# Patient Record
Sex: FEMALE | Race: WHITE | Employment: FULL TIME | URBAN - METROPOLITAN AREA
[De-identification: names, ages, dates, MRNs, and addresses within clinical notes are randomized per-mention and may not be internally consistent; named-entity substitution may affect disease eponyms.]

---

## 2019-11-22 ENCOUNTER — ANESTHESIA EVENT (OUTPATIENT)
Dept: SURGERY | Age: 64
End: 2019-11-22
Payer: COMMERCIAL

## 2019-11-22 RX ORDER — ALENDRONATE SODIUM 70 MG/1
TABLET ORAL
COMMUNITY
End: 2021-06-24

## 2019-11-22 RX ORDER — MELOXICAM 15 MG/1
15 TABLET ORAL DAILY
COMMUNITY
End: 2021-07-08

## 2019-11-22 NOTE — PERIOP NOTES
PAT - SURGICAL PRE-ADMISSION INSTRUCTIONS    NAME:  Demarcus Don                                                          TODAY'S DATE:  11/22/2019    SURGERY DATE:  11/25/2019                                  SURGERY ARRIVAL TIME:   TBV    1. Do NOT eat or drink anything, including candy or gum, after MIDNIGHT on 11/24/2019 , unless you have specific instructions from your Surgeon or Anesthesia Provider to do so. 2. No smoking on the day of surgery. 3. No alcohol 24 hours prior to the day of surgery. 4. No recreational drugs for one week prior to the day of surgery. 5. Leave all valuables, including money/purse, at home. 6. Remove all jewelry, nail polish, makeup (including mascara); no lotions, powders, deodorant, or perfume/cologne/after shave. 7. Glasses/Contact lenses and Dentures may be worn to the hospital.  They will be removed prior to surgery. 8. Call your doctor if symptoms of a cold or illness develop within 24 ours prior to surgery. 9. AN ADULT MUST DRIVE YOU HOME AFTER OUTPATIENT SURGERY. 10. If you are having an OUTPATIENT procedure, please make arrangements for a responsible adult to be with you for 24 hours after your surgery. 11. If you are admitted to the hospital, you will be assigned to a bed after surgery is complete. Normally a family member will not be able to see you until you are in your assigned bed. 15. Family is encouraged to accompany you to the hospital.  We ask visitors in the treatment area to be limited to ONE person at a time to ensure patient privacy. EXCEPTIONS WILL BE MADE AS NEEDED. 15. Children under 12 are discouraged from entering the treatment area and need to be supervised by an adult when in the waiting room.     Special Instructions:    STOP anticoagulants AT LEAST 1 WEEK PRIOR to your surgery or, follow other MD instructions:  No NSAIDS    Patient Prep:    shower with anti-bacterial soap    These surgical instructions were reviewed with Demarcus Don during the PAT visit. Directions: On the morning of surgery, please go to the [facility]. Enter the building from the [facility] entrance, 1st floor (next to the Emergency Room entrance). Take the elevator to the 2nd floor. Sign in at the Registration Desk.     If you have any questions and/or concerns, please do not hesitate to call:  (Prior to the day of surgery)  [facility] unit:  [phone]  (Day of surgery)  [facility] unit:  [phone]

## 2019-11-25 ENCOUNTER — ANESTHESIA (OUTPATIENT)
Dept: SURGERY | Age: 64
End: 2019-11-25
Payer: COMMERCIAL

## 2019-11-25 ENCOUNTER — HOSPITAL ENCOUNTER (OUTPATIENT)
Age: 64
Setting detail: OUTPATIENT SURGERY
Discharge: HOME | End: 2019-11-25
Attending: ORTHOPAEDIC SURGERY | Admitting: ORTHOPAEDIC SURGERY
Payer: COMMERCIAL

## 2019-11-25 VITALS
HEART RATE: 88 BPM | RESPIRATION RATE: 18 BRPM | WEIGHT: 149.38 LBS | HEIGHT: 60 IN | SYSTOLIC BLOOD PRESSURE: 150 MMHG | BODY MASS INDEX: 29.33 KG/M2 | TEMPERATURE: 97.7 F | DIASTOLIC BLOOD PRESSURE: 73 MMHG | OXYGEN SATURATION: 99 %

## 2019-11-25 DIAGNOSIS — M23.305 DERANGEMENT OF MEDIAL MENISCUS, UNSPECIFIED LATERALITY: Primary | ICD-10-CM

## 2019-11-25 PROCEDURE — 77030020754 HC CUF TRNQT 2BLA STRY -B: Performed by: ORTHOPAEDIC SURGERY

## 2019-11-25 PROCEDURE — 74011250636 HC RX REV CODE- 250/636: Performed by: ORTHOPAEDIC SURGERY

## 2019-11-25 PROCEDURE — 77030018835 HC SOL IRR LR ICUM -A: Performed by: ORTHOPAEDIC SURGERY

## 2019-11-25 PROCEDURE — 76010000138 HC OR TIME 0.5 TO 1 HR: Performed by: ORTHOPAEDIC SURGERY

## 2019-11-25 PROCEDURE — 76060000032 HC ANESTHESIA 0.5 TO 1 HR: Performed by: ORTHOPAEDIC SURGERY

## 2019-11-25 PROCEDURE — 74011250636 HC RX REV CODE- 250/636: Performed by: NURSE ANESTHETIST, CERTIFIED REGISTERED

## 2019-11-25 PROCEDURE — 74011000250 HC RX REV CODE- 250: Performed by: NURSE ANESTHETIST, CERTIFIED REGISTERED

## 2019-11-25 PROCEDURE — 77030002916 HC SUT ETHLN J&J -A: Performed by: ORTHOPAEDIC SURGERY

## 2019-11-25 PROCEDURE — 76210000006 HC OR PH I REC 0.5 TO 1 HR: Performed by: ORTHOPAEDIC SURGERY

## 2019-11-25 PROCEDURE — 77030032490 HC SLV COMPR SCD KNE COVD -B: Performed by: ORTHOPAEDIC SURGERY

## 2019-11-25 PROCEDURE — 74011250637 HC RX REV CODE- 250/637: Performed by: NURSE ANESTHETIST, CERTIFIED REGISTERED

## 2019-11-25 PROCEDURE — 77030004451 HC BUR SHV S&N -B: Performed by: ORTHOPAEDIC SURGERY

## 2019-11-25 PROCEDURE — 76210000026 HC REC RM PH II 1 TO 1.5 HR: Performed by: ORTHOPAEDIC SURGERY

## 2019-11-25 PROCEDURE — 74011000272 HC RX REV CODE- 272: Performed by: ORTHOPAEDIC SURGERY

## 2019-11-25 PROCEDURE — 74011000250 HC RX REV CODE- 250: Performed by: ORTHOPAEDIC SURGERY

## 2019-11-25 RX ORDER — FAMOTIDINE 20 MG/1
20 TABLET, FILM COATED ORAL ONCE
Status: COMPLETED | OUTPATIENT
Start: 2019-11-25 | End: 2019-11-25

## 2019-11-25 RX ORDER — GLYCOPYRROLATE 0.2 MG/ML
INJECTION INTRAMUSCULAR; INTRAVENOUS AS NEEDED
Status: DISCONTINUED | OUTPATIENT
Start: 2019-11-25 | End: 2019-11-25 | Stop reason: HOSPADM

## 2019-11-25 RX ORDER — DEXAMETHASONE SODIUM PHOSPHATE 4 MG/ML
INJECTION, SOLUTION INTRA-ARTICULAR; INTRALESIONAL; INTRAMUSCULAR; INTRAVENOUS; SOFT TISSUE AS NEEDED
Status: DISCONTINUED | OUTPATIENT
Start: 2019-11-25 | End: 2019-11-25 | Stop reason: HOSPADM

## 2019-11-25 RX ORDER — DIPHENHYDRAMINE HYDROCHLORIDE 50 MG/ML
12.5 INJECTION, SOLUTION INTRAMUSCULAR; INTRAVENOUS
Status: DISCONTINUED | OUTPATIENT
Start: 2019-11-25 | End: 2019-11-25 | Stop reason: HOSPADM

## 2019-11-25 RX ORDER — SODIUM CHLORIDE, SODIUM LACTATE, POTASSIUM CHLORIDE, CALCIUM CHLORIDE 600; 310; 30; 20 MG/100ML; MG/100ML; MG/100ML; MG/100ML
25 INJECTION, SOLUTION INTRAVENOUS CONTINUOUS
Status: DISCONTINUED | OUTPATIENT
Start: 2019-11-25 | End: 2019-11-25 | Stop reason: HOSPADM

## 2019-11-25 RX ORDER — ALBUTEROL SULFATE 0.83 MG/ML
2.5 SOLUTION RESPIRATORY (INHALATION) AS NEEDED
Status: DISCONTINUED | OUTPATIENT
Start: 2019-11-25 | End: 2019-11-25 | Stop reason: HOSPADM

## 2019-11-25 RX ORDER — PROPOFOL 10 MG/ML
INJECTION, EMULSION INTRAVENOUS AS NEEDED
Status: DISCONTINUED | OUTPATIENT
Start: 2019-11-25 | End: 2019-11-25 | Stop reason: HOSPADM

## 2019-11-25 RX ORDER — ONDANSETRON 2 MG/ML
INJECTION INTRAMUSCULAR; INTRAVENOUS AS NEEDED
Status: DISCONTINUED | OUTPATIENT
Start: 2019-11-25 | End: 2019-11-25 | Stop reason: HOSPADM

## 2019-11-25 RX ORDER — FENTANYL CITRATE 50 UG/ML
INJECTION, SOLUTION INTRAMUSCULAR; INTRAVENOUS AS NEEDED
Status: DISCONTINUED | OUTPATIENT
Start: 2019-11-25 | End: 2019-11-25 | Stop reason: HOSPADM

## 2019-11-25 RX ORDER — HYDROMORPHONE HYDROCHLORIDE 1 MG/ML
0.5 INJECTION, SOLUTION INTRAMUSCULAR; INTRAVENOUS; SUBCUTANEOUS
Status: DISCONTINUED | OUTPATIENT
Start: 2019-11-25 | End: 2019-11-25 | Stop reason: HOSPADM

## 2019-11-25 RX ORDER — HYDROMORPHONE HYDROCHLORIDE 1 MG/ML
INJECTION, SOLUTION INTRAMUSCULAR; INTRAVENOUS; SUBCUTANEOUS
Status: DISCONTINUED
Start: 2019-11-25 | End: 2019-11-25 | Stop reason: HOSPADM

## 2019-11-25 RX ORDER — SODIUM CHLORIDE, SODIUM LACTATE, POTASSIUM CHLORIDE, CALCIUM CHLORIDE 600; 310; 30; 20 MG/100ML; MG/100ML; MG/100ML; MG/100ML
100 INJECTION, SOLUTION INTRAVENOUS CONTINUOUS
Status: DISCONTINUED | OUTPATIENT
Start: 2019-11-25 | End: 2019-11-25 | Stop reason: HOSPADM

## 2019-11-25 RX ORDER — LIDOCAINE HYDROCHLORIDE 10 MG/ML
0.1 INJECTION, SOLUTION EPIDURAL; INFILTRATION; INTRACAUDAL; PERINEURAL AS NEEDED
Status: DISCONTINUED | OUTPATIENT
Start: 2019-11-25 | End: 2019-11-25 | Stop reason: HOSPADM

## 2019-11-25 RX ORDER — FENTANYL CITRATE 50 UG/ML
25 INJECTION, SOLUTION INTRAMUSCULAR; INTRAVENOUS
Status: DISCONTINUED | OUTPATIENT
Start: 2019-11-25 | End: 2019-11-25 | Stop reason: HOSPADM

## 2019-11-25 RX ORDER — LIDOCAINE HYDROCHLORIDE 20 MG/ML
INJECTION, SOLUTION EPIDURAL; INFILTRATION; INTRACAUDAL; PERINEURAL AS NEEDED
Status: DISCONTINUED | OUTPATIENT
Start: 2019-11-25 | End: 2019-11-25 | Stop reason: HOSPADM

## 2019-11-25 RX ORDER — MIDAZOLAM HYDROCHLORIDE 1 MG/ML
INJECTION, SOLUTION INTRAMUSCULAR; INTRAVENOUS AS NEEDED
Status: DISCONTINUED | OUTPATIENT
Start: 2019-11-25 | End: 2019-11-25 | Stop reason: HOSPADM

## 2019-11-25 RX ORDER — BUPIVACAINE HYDROCHLORIDE AND EPINEPHRINE 2.5; 5 MG/ML; UG/ML
INJECTION, SOLUTION EPIDURAL; INFILTRATION; INTRACAUDAL; PERINEURAL AS NEEDED
Status: DISCONTINUED | OUTPATIENT
Start: 2019-11-25 | End: 2019-11-25 | Stop reason: HOSPADM

## 2019-11-25 RX ORDER — OXYCODONE AND ACETAMINOPHEN 5; 325 MG/1; MG/1
1-2 TABLET ORAL
Qty: 25 TAB | Refills: 0 | Status: SHIPPED | OUTPATIENT
Start: 2019-11-25 | End: 2019-11-30

## 2019-11-25 RX ORDER — OXYCODONE AND ACETAMINOPHEN 5; 325 MG/1; MG/1
1 TABLET ORAL AS NEEDED
Status: COMPLETED | OUTPATIENT
Start: 2019-11-25 | End: 2019-11-25

## 2019-11-25 RX ORDER — CEFAZOLIN SODIUM 2 G/50ML
2 SOLUTION INTRAVENOUS
Status: COMPLETED | OUTPATIENT
Start: 2019-11-25 | End: 2019-11-25

## 2019-11-25 RX ADMIN — MIDAZOLAM 2 MG: 1 INJECTION INTRAMUSCULAR; INTRAVENOUS at 10:23

## 2019-11-25 RX ADMIN — FAMOTIDINE 20 MG: 20 TABLET ORAL at 10:16

## 2019-11-25 RX ADMIN — CEFAZOLIN SODIUM 2 G: 2 SOLUTION INTRAVENOUS at 10:38

## 2019-11-25 RX ADMIN — LIDOCAINE HYDROCHLORIDE 60 MG: 20 INJECTION, SOLUTION INTRAVENOUS at 10:33

## 2019-11-25 RX ADMIN — ONDANSETRON 4 MG: 2 SOLUTION INTRAMUSCULAR; INTRAVENOUS at 10:23

## 2019-11-25 RX ADMIN — SODIUM CHLORIDE, SODIUM LACTATE, POTASSIUM CHLORIDE, AND CALCIUM CHLORIDE 25 ML/HR: 600; 310; 30; 20 INJECTION, SOLUTION INTRAVENOUS at 10:17

## 2019-11-25 RX ADMIN — FENTANYL CITRATE 100 MCG: 50 INJECTION, SOLUTION INTRAMUSCULAR; INTRAVENOUS at 10:23

## 2019-11-25 RX ADMIN — HYDROMORPHONE HYDROCHLORIDE 0.5 MG: 1 INJECTION, SOLUTION INTRAMUSCULAR; INTRAVENOUS; SUBCUTANEOUS at 11:28

## 2019-11-25 RX ADMIN — DEXAMETHASONE SODIUM PHOSPHATE 4 MG: 4 INJECTION, SOLUTION INTRA-ARTICULAR; INTRALESIONAL; INTRAMUSCULAR; INTRAVENOUS; SOFT TISSUE at 10:55

## 2019-11-25 RX ADMIN — GLYCOPYRROLATE 0.2 MG: 0.2 INJECTION INTRAMUSCULAR; INTRAVENOUS at 10:23

## 2019-11-25 RX ADMIN — PROPOFOL 150 MG: 10 INJECTION, EMULSION INTRAVENOUS at 10:33

## 2019-11-25 RX ADMIN — HYDROMORPHONE HYDROCHLORIDE 0.5 MG: 1 INJECTION, SOLUTION INTRAMUSCULAR; INTRAVENOUS; SUBCUTANEOUS at 11:39

## 2019-11-25 RX ADMIN — OXYCODONE HYDROCHLORIDE AND ACETAMINOPHEN 1 TABLET: 5; 325 TABLET ORAL at 12:30

## 2019-11-25 RX ADMIN — PROPOFOL 50 MG: 10 INJECTION, EMULSION INTRAVENOUS at 10:49

## 2019-11-25 NOTE — ANESTHESIA POSTPROCEDURE EVALUATION
Procedure(s):  left KNEE ARTHROSCOPY with medial and lateral meniscectomy. general    Anesthesia Post Evaluation      Multimodal analgesia: multimodal analgesia used between 6 hours prior to anesthesia start to PACU discharge  Patient location during evaluation: bedside  Patient participation: complete - patient participated  Level of consciousness: awake  Pain management: adequate  Airway patency: patent  Anesthetic complications: no  Cardiovascular status: stable  Respiratory status: acceptable  Hydration status: acceptable  Post anesthesia nausea and vomiting:  controlled      Vitals Value Taken Time   /79 11/25/2019 11:50 AM   Temp 36.5 °C (97.7 °F) 11/25/2019 11:09 AM   Pulse 67 11/25/2019 11:54 AM   Resp 13 11/25/2019 11:54 AM   SpO2 100 % 11/25/2019 11:55 AM   Vitals shown include unvalidated device data.

## 2019-11-25 NOTE — ANESTHESIA PREPROCEDURE EVALUATION
Relevant Problems   No relevant active problems       Anesthetic History   No history of anesthetic complications            Review of Systems / Medical History  Patient summary reviewed and pertinent labs reviewed    Pulmonary  Within defined limits                 Neuro/Psych   Within defined limits           Cardiovascular                  Exercise tolerance: >4 METS     GI/Hepatic/Renal  Within defined limits              Endo/Other             Other Findings              Physical Exam    Airway  Mallampati: II  TM Distance: 4 - 6 cm  Neck ROM: normal range of motion   Mouth opening: Diminished (comment)     Cardiovascular  Regular rate and rhythm,  S1 and S2 normal,  no murmur, click, rub, or gallop             Dental  No notable dental hx       Pulmonary  Breath sounds clear to auscultation               Abdominal  GI exam deferred       Other Findings            Anesthetic Plan    ASA: 2            Induction: Intravenous  Anesthetic plan and risks discussed with: Patient

## 2019-11-25 NOTE — PERIOP NOTES
Phase 2 Recovery Summary    Report received from Alicia Trujillo RN    Vitals:    11/25/19 1134 11/25/19 1150 11/25/19 1203 11/25/19 1221   BP: 157/74 159/79  150/73   Pulse: 85 69 88 88   Resp: 23 13  18   Temp:       SpO2: 100% 100% 99% 99%   Weight:       Height:           oriented to time, place, person and situation          Lines and Drains  Peripheral Intravenous Line:   Peripheral IV 11/25/19 Right Hand (Active)   Site Assessment Clean, dry, & intact 11/25/2019 11:09 AM   Phlebitis Assessment 0 11/25/2019 11:09 AM   Infiltration Assessment 0 11/25/2019 11:09 AM   Dressing Status Clean, dry, & intact 11/25/2019 10:12 AM   Dressing Type Transparent 11/25/2019 11:09 AM   Hub Color/Line Status Pink; Infusing 11/25/2019 11:09 AM   Action Taken Open ports on tubing capped 11/25/2019 10:12 AM   Alcohol Cap Used Yes 11/25/2019 11:09 AM       Wound  Wound Knee Left (Active)   Dressing Status Clean, dry, and intact 11/25/2019 11:53 AM   Incision Site Well Approximated Yes 11/25/2019 11:08 AM   Drainage Amount None 11/25/2019 11:53 AM   Number of days: 0        Patient assisted to chair with minimal assist. Pateint tolerating liquids well. Patient's family at bedside. Discharge discussed with patient and family. No questions or concerns at this time. Patient had time to ask any questions. Pain at 6/10, gave 1 tab Percocet  at 1230. Pain decreased to 2/10 prior to discharge. Written discharge instructions from Dr. Aranza Wang office provided. Patient discharged to home, with .       Angeles Rodriguez RN

## 2019-11-25 NOTE — BRIEF OP NOTE
BRIEF OPERATIVE NOTE    Date of Procedure: 11/25/2019   Preoperative Diagnosis: left knee medial and lateral meniscus tear s83.242a/ s83.282a  Postoperative Diagnosis: same   Procedure(s):  left KNEE ARTHROSCOPY with medial and lateral meniscectomy  Surgeon(s) and Role:     Danuta Torrez MD - Primary         Surgical Assistant: Marlen Daniels    Surgical Staff:  Circ-1: Andrew Sibley  Scrub Tech-1: Keila Small  Surg Asst-1: Jaclynn Carrel  Event Time In Time Out   Incision Start 1048    Incision Close 1109      Anesthesia: General and local  Estimated Blood Loss: minimal  Specimens: none   Findings: MMT LMT [id number]  Complications: none  Implants: none

## 2019-11-25 NOTE — H&P
H&P Update:  Demarcus Don was seen and examined. History and physical has been reviewed. The patient has been examined. There have been no significant clinical changes since the completion of the originally dated History and Physical.  Patient identified by surgeon; surgical site was confirmed by patient and surgeon.     Heart RRR WNL  Chest CTAB    Proceed with surgery

## 2019-11-26 NOTE — OP NOTES
[address] [facility]  OPERATIVE REPORT    Name:  Julianna Rincon  MR#:   [id number]  :  1955  ACCOUNT #:  [de-identified]  DATE OF SERVICE:  2019      PREOPERATIVE DIAGNOSIS:  Left knee medial and lateral meniscus tear. POSTOPERATIVE DIAGNOSIS:  Left knee medial and lateral meniscus tear. PROCEDURE PERFORMED:  Left knee arthroscopy with medial and lateral meniscectomy. SURGEON:  Mona Enamorado MD    ASSISTANT:   Raegan Wagner, assisted with surgery and closure. INCISION START:  1048    INCISION CLOSED:  1109    ANESTHESIA:  General with local.    ESTIMATED BLOOD LOSS:  Minimal.    SPECIMENS:  None. COMPLICATIONS:  None. IMPLANTS:  None. INDICATION FOR PROCEDURE:  The patient is a pleasant 26-year-old female with a history of left knee medial and lateral meniscus tear. She was brought to the operative suite for definitive management. Informed consent was obtained. Risks and benefits were explained to her in full including but not limited to bleeding, infection, neurovascular damage, pain, stiffness, swelling, further surgery, revision surgery, and incomplete resolution of symptoms. PROCEDURE:  The patient was brought to the operative suite, placed in supine position. She was intubated with general endotracheal anesthesia. She was prepped and draped over the left leg in normal sterile fashion, secured in the arthroscopic leg ordonez. Tourniquet was inflated to 250 mmHg for a total of 25 minutes. We proceeded to make a standard inferolateral portal.  Inferomedial portal was made under direct visualization with spinal needle. We proceeded to explore the knee with the following findings:  Suprapatellar pouch was unremarkable. Patellofemoral joint had some grade I-II arthritic changes, with fraying. Medial and lateral gutters were unremarkable. Medial compartment had a complex posterior horn meniscal tear partial separation of the posterior wall and synovitis.   ACL and PCL were intact. Lateral compartment had a complex anterior wall meniscus tear extending with grade II degenerative change laterally. Some degenerative changes in the femur as well. After this was done, we proceeded to perform an extensive meniscectomy, posterior and anterior horn of the lateral meniscus to a good stable base. No residual tearing is appreciated at this time using a shaver. Brief chondroplasty was done otherwise. We then proceeded to turn our attention to the medial compartment. We proceeded to debride the torn frayed meniscal tissue within the red-red zone. After this was done, we extended vertically through and through as such the superior flap was debrided to a good stable base at the posterior aspect of the synovial wall without any residual instability. We proceeded to copiously irrigate the knee otherwise. All the instruments were removed from the knee. Incision was closed with simple nylon stitch. A dry sterile dressing was placed. Tourniquet was taken down. She was awoken from anesthesia and brought to postop care unit in stable condition.       MD LARRY [email or web address]  D:  11/25/2019 11:38  T:  11/25/2019 21:18  JOB #:  [id number]

## 2019-11-26 NOTE — PROGRESS NOTES
Demarcus Old post L knee arthroscopy with medial and lateral meniscectomy follow up call complete on 11/26/2019     Home health not indicated at present time   Pain is manageable on current medication. Stated use of pain medication percocet is used frequently as prescribed  Dressing looks clean and intact, no drainage noted. Not getting in a bathtub, swimming pool or hot tub. Doing PT exercises as shown twice a day per MD protocol. Patient TO changing positions frequently and walking every hour short distance to help with stiffness and soreness. TO Using ice for pain control and swelling as instructed. TO eating protein and drinking plenty of fluids for healing  Bowels active since surgery. Taking blood thinner medication as prescribed  TO knows when their follow up appointment is with Dr. Dominick Paul on 12/4/2019    Patient reports doing fair at home. Stated the pain is much more present day 2, however, getting around and doing what was communicated by staff to do. Family support system. Contact information of Coordinator provided for all questions and concerns.

## 2021-06-08 ENCOUNTER — TRANSCRIBE ORDER (OUTPATIENT)
Dept: REGISTRATION | Age: 66
End: 2021-06-08

## 2021-06-08 ENCOUNTER — HOSPITAL ENCOUNTER (OUTPATIENT)
Dept: PREADMISSION TESTING | Age: 66
Discharge: HOME | End: 2021-06-08
Payer: COMMERCIAL

## 2021-06-08 DIAGNOSIS — Z01.818 PREOPERATIVE EXAMINATION, UNSPECIFIED: ICD-10-CM

## 2021-06-08 DIAGNOSIS — Z01.818 PREOPERATIVE EXAMINATION, UNSPECIFIED: Primary | ICD-10-CM

## 2021-06-08 LAB
ALBUMIN SERPL-MCNC: 3.4 G/DL (ref 3.4–5)
ALBUMIN/GLOB SERPL: 1.2 {RATIO} (ref 0.8–1.7)
ALP SERPL-CCNC: 64 U/L (ref 45–117)
ALT SERPL-CCNC: 35 U/L (ref 13–56)
ANION GAP SERPL CALC-SCNC: 5 MMOL/L (ref 3–18)
APPEARANCE UR: CLEAR
APTT PPP: 23 SEC (ref 23–36.4)
AST SERPL-CCNC: 29 U/L (ref 10–38)
ATRIAL RATE: 69 BPM
BASOPHILS # BLD: 0 K/UL (ref 0–0.1)
BASOPHILS NFR BLD: 1 % (ref 0–2)
BILIRUB SERPL-MCNC: 0.2 MG/DL (ref 0.2–1)
BILIRUB UR QL: NEGATIVE
BUN SERPL-MCNC: 24 MG/DL (ref 7–18)
BUN/CREAT SERPL: 39 (ref 12–20)
CALCIUM SERPL-MCNC: 9 MG/DL (ref 8.5–10.1)
CALCULATED P AXIS, ECG09: 66 DEGREES
CALCULATED R AXIS, ECG10: 61 DEGREES
CALCULATED T AXIS, ECG11: 57 DEGREES
CHLORIDE SERPL-SCNC: 108 MMOL/L (ref 100–111)
CO2 SERPL-SCNC: 29 MMOL/L (ref 21–32)
COLOR UR: YELLOW
CREAT SERPL-MCNC: 0.61 MG/DL (ref 0.6–1.3)
DIAGNOSIS, 93000: NORMAL
DIFFERENTIAL METHOD BLD: ABNORMAL
EOSINOPHIL # BLD: 0.3 K/UL (ref 0–0.4)
EOSINOPHIL NFR BLD: 4 % (ref 0–5)
ERYTHROCYTE [DISTWIDTH] IN BLOOD BY AUTOMATED COUNT: 13.3 % (ref 11.6–14.5)
ERYTHROCYTE [SEDIMENTATION RATE] IN BLOOD: 14 MM/HR (ref 0–30)
EST. AVERAGE GLUCOSE BLD GHB EST-MCNC: 111 MG/DL
GLOBULIN SER CALC-MCNC: 2.9 G/DL (ref 2–4)
GLUCOSE SERPL-MCNC: 110 MG/DL (ref 74–99)
GLUCOSE UR STRIP.AUTO-MCNC: NEGATIVE MG/DL
HBA1C MFR BLD: 5.5 % (ref 4.2–5.6)
HCT VFR BLD AUTO: 34.5 % (ref 35–45)
HGB BLD-MCNC: 11.5 G/DL (ref 12–16)
HGB UR QL STRIP: NEGATIVE
INR PPP: 0.9 (ref 0.8–1.2)
KETONES UR QL STRIP.AUTO: NEGATIVE MG/DL
LEUKOCYTE ESTERASE UR QL STRIP.AUTO: NEGATIVE
LYMPHOCYTES # BLD: 2 K/UL (ref 0.9–3.6)
LYMPHOCYTES NFR BLD: 30 % (ref 21–52)
MCH RBC QN AUTO: 30.3 PG (ref 24–34)
MCHC RBC AUTO-ENTMCNC: 33.3 G/DL (ref 31–37)
MCV RBC AUTO: 90.8 FL (ref 74–97)
MONOCYTES # BLD: 0.5 K/UL (ref 0.05–1.2)
MONOCYTES NFR BLD: 8 % (ref 3–10)
NEUTS SEG # BLD: 3.6 K/UL (ref 1.8–8)
NEUTS SEG NFR BLD: 57 % (ref 40–73)
NITRITE UR QL STRIP.AUTO: NEGATIVE
P-R INTERVAL, ECG05: 178 MS
PH UR STRIP: 5.5 [PH] (ref 5–8)
PLATELET # BLD AUTO: 196 K/UL (ref 135–420)
PMV BLD AUTO: 9.6 FL (ref 9.2–11.8)
POTASSIUM SERPL-SCNC: 4.2 MMOL/L (ref 3.5–5.5)
PROT SERPL-MCNC: 6.3 G/DL (ref 6.4–8.2)
PROT UR STRIP-MCNC: NEGATIVE MG/DL
PROTHROMBIN TIME: 12.4 SEC (ref 11.5–15.2)
Q-T INTERVAL, ECG07: 358 MS
QRS DURATION, ECG06: 84 MS
QTC CALCULATION (BEZET), ECG08: 383 MS
RBC # BLD AUTO: 3.8 M/UL (ref 4.2–5.3)
SODIUM SERPL-SCNC: 142 MMOL/L (ref 136–145)
SP GR UR REFRACTOMETRY: 1.02 (ref 1–1.03)
UROBILINOGEN UR QL STRIP.AUTO: 0.2 EU/DL (ref 0.2–1)
VENTRICULAR RATE, ECG03: 69 BPM
WBC # BLD AUTO: 6.4 K/UL (ref 4.6–13.2)

## 2021-06-08 PROCEDURE — 93005 ELECTROCARDIOGRAM TRACING: CPT

## 2021-06-08 PROCEDURE — 81003 URINALYSIS AUTO W/O SCOPE: CPT

## 2021-06-08 PROCEDURE — 85730 THROMBOPLASTIN TIME PARTIAL: CPT

## 2021-06-08 PROCEDURE — 85610 PROTHROMBIN TIME: CPT

## 2021-06-08 PROCEDURE — 83036 HEMOGLOBIN GLYCOSYLATED A1C: CPT

## 2021-06-08 PROCEDURE — 85025 COMPLETE CBC W/AUTO DIFF WBC: CPT

## 2021-06-08 PROCEDURE — 36415 COLL VENOUS BLD VENIPUNCTURE: CPT

## 2021-06-08 PROCEDURE — 85652 RBC SED RATE AUTOMATED: CPT

## 2021-06-08 PROCEDURE — 80053 COMPREHEN METABOLIC PANEL: CPT

## 2021-06-09 LAB
BACTERIA SPEC CULT: NORMAL
BACTERIA SPEC CULT: NORMAL
SERVICE CMNT-IMP: NORMAL

## 2021-07-07 ENCOUNTER — ANESTHESIA EVENT (OUTPATIENT)
Dept: SURGERY | Age: 66
End: 2021-07-07
Payer: COMMERCIAL

## 2021-07-07 PROBLEM — M17.12 OSTEOARTHRITIS OF LEFT KNEE: Chronic | Status: ACTIVE | Noted: 2021-07-07

## 2021-07-07 NOTE — H&P
[address] [facility] [address]  History and Physical Exam    Patient: Lulu Ellington MRN: [id number]  SSN: [id number]    YOB: 1955  Age: 78 y.o. Sex: female      Subjective:      Chief Complaint: Left knee pain    History of Present Illness:  Patient complains of pain to the left knee and difficulty ambulating, which has progressively worsened over several months. X-rays showed osteoarthritis of the joint. The patient's pain has persisted and progressed despite conservative treatments and therapies. The patient has been previously treated with medications and/or injections. The patient has at this time opted for surgical intervention. Past Medical History:   Diagnosis Date    Arthritis     Osteoarthritis of left knee 7/7/2021    Osteopenia      Past Surgical History:   Procedure Laterality Date    HX GYN  1984    laparotomy    HX ORTHOPAEDIC Left 1990s    wrist      Social History     Occupational History    Not on file   Tobacco Use    Smoking status: Never Smoker    Smokeless tobacco: Never Used   Vaping Use    Vaping Use: Never used   Substance and Sexual Activity    Alcohol use: Yes     Alcohol/week: 5.0 standard drinks     Types: 3 Glasses of wine, 2 Cans of beer per week    Drug use: Never    Sexual activity: Not on file     Prior to Admission medications    Medication Sig Start Date End Date Taking? Authorizing Provider   red yeast rice extract 600 mg cap Take 600 mg by mouth daily. Provider, Historical   B.infantis-B.ani-B.long-B.bifi (Probiotic 4X) 10-15 mg TbEC Take  by mouth daily. Provider, Historical   cartilage/collagen II/hyaluron (MOVE FREE ULTRA PO) Take  by mouth daily.     Provider, Historical   estrogen, conjugated,-medroxyPROGESTERone (PREMPRO) 0.3-1.5 mg tab TAKE ONE TABLET BY MOUTH ONCE DAILY    Provider, Historical   Multivitamins with Fluoride (MULTI-VITAMIN PO) Multi Vitamin    Provider, Historical   KRILL OIL PO krill oil    Provider, Historical   calcium carbonate/vitamin D3 (CALCIUM 500 + D PO) Calcium 500 + D    Provider, Historical   meloxicam (MOBIC) 15 mg tablet Take 15 mg by mouth daily. Provider, Historical       Allergies: No Known Allergies     Review of Systems:  Pertinent items are noted in the History of Present Illness. Objective:       Physical Exam:  HEENT: Normocephalic, atraumatic  Lungs:  Clear to auscultation  Heart:   Regular rate and rhythm  Abdomen: Soft  Extremities:  Pain with range of motion of the left knee. Active ROM limited due to pain. Tenderness generalized. Crepitus present. Antalgic gait. Assessment:      Arthritis of the left knee. Plan:       Proceed with scheduled LEFT TOTAL KNEE ARTHROPLASTY. The various methods of treatment have been discussed with the patient and family. After consideration of risks, benefits, and other options for treatment, the patient has consented to surgical interventions. Questions were answered and preoperative teaching was done by Dr Elio Xiong.      Signed By: Soto Bennett     July 7, 2021

## 2021-07-08 ENCOUNTER — HOSPITAL ENCOUNTER (OUTPATIENT)
Age: 66
Discharge: HOME | End: 2021-07-08
Attending: ORTHOPAEDIC SURGERY | Admitting: ORTHOPAEDIC SURGERY
Payer: COMMERCIAL

## 2021-07-08 ENCOUNTER — HOME HEALTH ADMISSION (OUTPATIENT)
Dept: HOME HEALTH SERVICES | Facility: HOME HEALTH | Age: 66
End: 2021-07-08
Payer: MEDICARE

## 2021-07-08 ENCOUNTER — ANESTHESIA (OUTPATIENT)
Dept: SURGERY | Age: 66
End: 2021-07-08
Payer: COMMERCIAL

## 2021-07-08 ENCOUNTER — APPOINTMENT (OUTPATIENT)
Dept: GENERAL RADIOLOGY | Age: 66
End: 2021-07-08
Attending: PHYSICIAN ASSISTANT
Payer: COMMERCIAL

## 2021-07-08 VITALS
HEART RATE: 69 BPM | SYSTOLIC BLOOD PRESSURE: 120 MMHG | RESPIRATION RATE: 16 BRPM | HEIGHT: 60 IN | WEIGHT: 149.5 LBS | OXYGEN SATURATION: 97 % | DIASTOLIC BLOOD PRESSURE: 73 MMHG | BODY MASS INDEX: 29.35 KG/M2 | TEMPERATURE: 97.1 F

## 2021-07-08 DIAGNOSIS — M17.12 PRIMARY OSTEOARTHRITIS OF LEFT KNEE: Primary | ICD-10-CM

## 2021-07-08 LAB
ABO + RH BLD: NORMAL
BLOOD GROUP ANTIBODIES SERPL: NORMAL
SPECIMEN EXP DATE BLD: NORMAL

## 2021-07-08 PROCEDURE — 74011000250 HC RX REV CODE- 250: Performed by: NURSE ANESTHETIST, CERTIFIED REGISTERED

## 2021-07-08 PROCEDURE — 74011000258 HC RX REV CODE- 258: Performed by: ORTHOPAEDIC SURGERY

## 2021-07-08 PROCEDURE — 36415 COLL VENOUS BLD VENIPUNCTURE: CPT

## 2021-07-08 PROCEDURE — 76210000006 HC OR PH I REC 0.5 TO 1 HR: Performed by: ORTHOPAEDIC SURGERY

## 2021-07-08 PROCEDURE — 74011000250 HC RX REV CODE- 250: Performed by: ORTHOPAEDIC SURGERY

## 2021-07-08 PROCEDURE — 97161 PT EVAL LOW COMPLEX 20 MIN: CPT

## 2021-07-08 PROCEDURE — 77030039760: Performed by: ORTHOPAEDIC SURGERY

## 2021-07-08 PROCEDURE — 74011250636 HC RX REV CODE- 250/636: Performed by: ORTHOPAEDIC SURGERY

## 2021-07-08 PROCEDURE — 77030020782 HC GWN BAIR PAWS FLX 3M -B: Performed by: ORTHOPAEDIC SURGERY

## 2021-07-08 PROCEDURE — 73560 X-RAY EXAM OF KNEE 1 OR 2: CPT

## 2021-07-08 PROCEDURE — C1713 ANCHOR/SCREW BN/BN,TIS/BN: HCPCS | Performed by: ORTHOPAEDIC SURGERY

## 2021-07-08 PROCEDURE — 86901 BLOOD TYPING SEROLOGIC RH(D): CPT

## 2021-07-08 PROCEDURE — 97165 OT EVAL LOW COMPLEX 30 MIN: CPT

## 2021-07-08 PROCEDURE — 76942 ECHO GUIDE FOR BIOPSY: CPT | Performed by: ORTHOPAEDIC SURGERY

## 2021-07-08 PROCEDURE — 64450 NJX AA&/STRD OTHER PN/BRANCH: CPT | Performed by: ANESTHESIOLOGY

## 2021-07-08 PROCEDURE — 97116 GAIT TRAINING THERAPY: CPT

## 2021-07-08 PROCEDURE — 74011250637 HC RX REV CODE- 250/637: Performed by: ORTHOPAEDIC SURGERY

## 2021-07-08 PROCEDURE — 77030034694 HC SCPL CANADY PLSM DISP USMD -E: Performed by: ORTHOPAEDIC SURGERY

## 2021-07-08 PROCEDURE — 77030031139 HC SUT VCRL2 J&J -A: Performed by: ORTHOPAEDIC SURGERY

## 2021-07-08 PROCEDURE — 77030013708 HC HNDPC SUC IRR PULS STRY –B: Performed by: ORTHOPAEDIC SURGERY

## 2021-07-08 PROCEDURE — 74011250637 HC RX REV CODE- 250/637: Performed by: PHYSICIAN ASSISTANT

## 2021-07-08 PROCEDURE — 74011250636 HC RX REV CODE- 250/636: Performed by: ANESTHESIOLOGY

## 2021-07-08 PROCEDURE — 76060000033 HC ANESTHESIA 1 TO 1.5 HR: Performed by: ORTHOPAEDIC SURGERY

## 2021-07-08 PROCEDURE — 77030016060 HC NDL NRV BLK TELE -A: Performed by: ANESTHESIOLOGY

## 2021-07-08 PROCEDURE — 77010033678 HC OXYGEN DAILY

## 2021-07-08 PROCEDURE — 74011000258 HC RX REV CODE- 258: Performed by: NURSE ANESTHETIST, CERTIFIED REGISTERED

## 2021-07-08 PROCEDURE — 77030033263 HC DRSG MEPILEX 16-48IN BORD MOLN -B: Performed by: ORTHOPAEDIC SURGERY

## 2021-07-08 PROCEDURE — 77030027138 HC INCENT SPIROMETER -A: Performed by: ORTHOPAEDIC SURGERY

## 2021-07-08 PROCEDURE — 77030011628: Performed by: ORTHOPAEDIC SURGERY

## 2021-07-08 PROCEDURE — 76210000025 HC REC RM PH II 3 TO 3.5 HR

## 2021-07-08 PROCEDURE — 74011250637 HC RX REV CODE- 250/637: Performed by: ANESTHESIOLOGY

## 2021-07-08 PROCEDURE — 97535 SELF CARE MNGMENT TRAINING: CPT

## 2021-07-08 PROCEDURE — C1776 JOINT DEVICE (IMPLANTABLE): HCPCS | Performed by: ORTHOPAEDIC SURGERY

## 2021-07-08 PROCEDURE — 77030037713 HC CLOSR DEV INCIS ZIP STRY -B: Performed by: ORTHOPAEDIC SURGERY

## 2021-07-08 PROCEDURE — 74011250636 HC RX REV CODE- 250/636: Performed by: PHYSICIAN ASSISTANT

## 2021-07-08 PROCEDURE — 74011250636 HC RX REV CODE- 250/636: Performed by: NURSE ANESTHETIST, CERTIFIED REGISTERED

## 2021-07-08 PROCEDURE — 77030012893: Performed by: ORTHOPAEDIC SURGERY

## 2021-07-08 PROCEDURE — 76010000149 HC OR TIME 1 TO 1.5 HR: Performed by: ORTHOPAEDIC SURGERY

## 2021-07-08 PROCEDURE — 2709999900 HC NON-CHARGEABLE SUPPLY: Performed by: ORTHOPAEDIC SURGERY

## 2021-07-08 PROCEDURE — 77030003666 HC NDL SPINAL BD -A: Performed by: ORTHOPAEDIC SURGERY

## 2021-07-08 PROCEDURE — 77030002934 HC SUT MCRYL J&J -B: Performed by: ORTHOPAEDIC SURGERY

## 2021-07-08 PROCEDURE — 64447 NJX AA&/STRD FEMORAL NRV IMG: CPT | Performed by: ANESTHESIOLOGY

## 2021-07-08 PROCEDURE — 77030038010: Performed by: ORTHOPAEDIC SURGERY

## 2021-07-08 PROCEDURE — 77030012508 HC MSK AIRWY LMA AMBU -A: Performed by: ANESTHESIOLOGY

## 2021-07-08 DEVICE — LT SIZE 4 NARROW FEMORAL CR NONPOROUS
Type: IMPLANTABLE DEVICE | Site: KNEE | Status: FUNCTIONAL
Brand: BKS TRIMAX

## 2021-07-08 DEVICE — SIZE 3 TIBIAL TRAY NONPOROUS
Type: IMPLANTABLE DEVICE | Site: KNEE | Status: FUNCTIONAL
Brand: BALANCED KNEE SYSTEM

## 2021-07-08 DEVICE — SIZE 3 9MM TIBIAL INSERT UC
Type: IMPLANTABLE DEVICE | Site: KNEE | Status: FUNCTIONAL
Brand: BKS E-VITALIZE

## 2021-07-08 DEVICE — KNEE K1 TOT HEMI STD CEM IMPL CAPPED K1 OD: Type: IMPLANTABLE DEVICE | Site: KNEE | Status: FUNCTIONAL

## 2021-07-08 DEVICE — 29MM PATELLA, VITAMIN E
Type: IMPLANTABLE DEVICE | Site: KNEE | Status: FUNCTIONAL
Brand: BKS E-VITALIZE

## 2021-07-08 DEVICE — SMARTSET HV HIGH VISCOSITY BONE CEMENT 40G
Type: IMPLANTABLE DEVICE | Site: KNEE | Status: FUNCTIONAL
Brand: SMARTSET

## 2021-07-08 RX ORDER — FENTANYL CITRATE 50 UG/ML
INJECTION, SOLUTION INTRAMUSCULAR; INTRAVENOUS AS NEEDED
Status: DISCONTINUED | OUTPATIENT
Start: 2021-07-08 | End: 2021-07-08 | Stop reason: HOSPADM

## 2021-07-08 RX ORDER — METOCLOPRAMIDE HYDROCHLORIDE 5 MG/ML
10 INJECTION INTRAMUSCULAR; INTRAVENOUS
Status: DISCONTINUED | OUTPATIENT
Start: 2021-07-08 | End: 2021-07-08 | Stop reason: HOSPADM

## 2021-07-08 RX ORDER — CEFAZOLIN SODIUM/WATER 2 G/20 ML
2 SYRINGE (ML) INTRAVENOUS EVERY 8 HOURS
Status: DISCONTINUED | OUTPATIENT
Start: 2021-07-08 | End: 2021-07-08 | Stop reason: HOSPADM

## 2021-07-08 RX ORDER — CELECOXIB 100 MG/1
400 CAPSULE ORAL
Status: COMPLETED | OUTPATIENT
Start: 2021-07-08 | End: 2021-07-08

## 2021-07-08 RX ORDER — ACETAMINOPHEN 500 MG
1000 TABLET ORAL ONCE
Status: COMPLETED | OUTPATIENT
Start: 2021-07-08 | End: 2021-07-08

## 2021-07-08 RX ORDER — ZOLPIDEM TARTRATE 5 MG/1
5-10 TABLET ORAL
Status: DISCONTINUED | OUTPATIENT
Start: 2021-07-08 | End: 2021-07-08 | Stop reason: HOSPADM

## 2021-07-08 RX ORDER — MELOXICAM 7.5 MG/1
7.5 TABLET ORAL 2 TIMES DAILY
Qty: 28 TABLET | Refills: 0 | Status: SHIPPED | OUTPATIENT
Start: 2021-07-08 | End: 2021-07-22

## 2021-07-08 RX ORDER — ACETAMINOPHEN 500 MG
1000 TABLET ORAL ONCE
Status: DISCONTINUED | OUTPATIENT
Start: 2021-07-08 | End: 2021-07-08 | Stop reason: SDUPTHER

## 2021-07-08 RX ORDER — TRANEXAMIC ACID 650 1/1
1950 TABLET ORAL SEE ADMIN INSTRUCTIONS
Status: DISCONTINUED | OUTPATIENT
Start: 2021-07-08 | End: 2021-07-08 | Stop reason: HOSPADM

## 2021-07-08 RX ORDER — SODIUM CHLORIDE 0.9 % (FLUSH) 0.9 %
5-40 SYRINGE (ML) INJECTION EVERY 8 HOURS
Status: DISCONTINUED | OUTPATIENT
Start: 2021-07-08 | End: 2021-07-08 | Stop reason: HOSPADM

## 2021-07-08 RX ORDER — DEXAMETHASONE SODIUM PHOSPHATE 4 MG/ML
4 INJECTION, SOLUTION INTRA-ARTICULAR; INTRALESIONAL; INTRAMUSCULAR; INTRAVENOUS; SOFT TISSUE ONCE
Status: COMPLETED | OUTPATIENT
Start: 2021-07-08 | End: 2021-07-08

## 2021-07-08 RX ORDER — CEFAZOLIN SODIUM/WATER 2 G/20 ML
2 SYRINGE (ML) INTRAVENOUS ONCE
Status: COMPLETED | OUTPATIENT
Start: 2021-07-08 | End: 2021-07-08

## 2021-07-08 RX ORDER — ASPIRIN 81 MG/1
81 TABLET ORAL 2 TIMES DAILY
Status: DISCONTINUED | OUTPATIENT
Start: 2021-07-08 | End: 2021-07-08 | Stop reason: HOSPADM

## 2021-07-08 RX ORDER — PROPOFOL 10 MG/ML
INJECTION, EMULSION INTRAVENOUS AS NEEDED
Status: DISCONTINUED | OUTPATIENT
Start: 2021-07-08 | End: 2021-07-08 | Stop reason: HOSPADM

## 2021-07-08 RX ORDER — MELOXICAM 7.5 MG/1
7.5 TABLET ORAL 2 TIMES DAILY
Qty: 28 TABLET | Refills: 0 | OUTPATIENT
Start: 2021-07-08 | End: 2021-07-08 | Stop reason: SDUPTHER

## 2021-07-08 RX ORDER — DOCUSATE SODIUM 100 MG/1
100 CAPSULE, LIQUID FILLED ORAL DAILY
Status: DISCONTINUED | OUTPATIENT
Start: 2021-07-09 | End: 2021-07-08 | Stop reason: HOSPADM

## 2021-07-08 RX ORDER — PANTOPRAZOLE SODIUM 40 MG/1
40 TABLET, DELAYED RELEASE ORAL DAILY
Status: DISCONTINUED | OUTPATIENT
Start: 2021-07-08 | End: 2021-07-08

## 2021-07-08 RX ORDER — HYDROMORPHONE HYDROCHLORIDE 1 MG/ML
0.5 INJECTION, SOLUTION INTRAMUSCULAR; INTRAVENOUS; SUBCUTANEOUS
Status: DISCONTINUED | OUTPATIENT
Start: 2021-07-08 | End: 2021-07-08 | Stop reason: HOSPADM

## 2021-07-08 RX ORDER — NALOXONE HYDROCHLORIDE 0.4 MG/ML
0.4 INJECTION, SOLUTION INTRAMUSCULAR; INTRAVENOUS; SUBCUTANEOUS AS NEEDED
Status: DISCONTINUED | OUTPATIENT
Start: 2021-07-08 | End: 2021-07-08 | Stop reason: HOSPADM

## 2021-07-08 RX ORDER — MIDAZOLAM HYDROCHLORIDE 1 MG/ML
INJECTION, SOLUTION INTRAMUSCULAR; INTRAVENOUS AS NEEDED
Status: DISCONTINUED | OUTPATIENT
Start: 2021-07-08 | End: 2021-07-08 | Stop reason: HOSPADM

## 2021-07-08 RX ORDER — FENTANYL CITRATE 50 UG/ML
50 INJECTION, SOLUTION INTRAMUSCULAR; INTRAVENOUS
Status: DISCONTINUED | OUTPATIENT
Start: 2021-07-08 | End: 2021-07-08 | Stop reason: HOSPADM

## 2021-07-08 RX ORDER — SODIUM CHLORIDE 9 MG/ML
300 INJECTION, SOLUTION INTRAVENOUS CONTINUOUS
Status: DISCONTINUED | OUTPATIENT
Start: 2021-07-08 | End: 2021-07-08 | Stop reason: HOSPADM

## 2021-07-08 RX ORDER — PANTOPRAZOLE SODIUM 40 MG/1
40 TABLET, DELAYED RELEASE ORAL DAILY
Status: DISCONTINUED | OUTPATIENT
Start: 2021-07-08 | End: 2021-07-08 | Stop reason: HOSPADM

## 2021-07-08 RX ORDER — OXYCODONE HYDROCHLORIDE 5 MG/1
5 TABLET ORAL
Status: DISCONTINUED | OUTPATIENT
Start: 2021-07-08 | End: 2021-07-08

## 2021-07-08 RX ORDER — GLYCOPYRROLATE 0.2 MG/ML
INJECTION INTRAMUSCULAR; INTRAVENOUS AS NEEDED
Status: DISCONTINUED | OUTPATIENT
Start: 2021-07-08 | End: 2021-07-08 | Stop reason: HOSPADM

## 2021-07-08 RX ORDER — SODIUM CHLORIDE 9 MG/ML
125 INJECTION, SOLUTION INTRAVENOUS CONTINUOUS
Status: DISCONTINUED | OUTPATIENT
Start: 2021-07-08 | End: 2021-07-08 | Stop reason: HOSPADM

## 2021-07-08 RX ORDER — DIPHENHYDRAMINE HYDROCHLORIDE 50 MG/ML
12.5 INJECTION, SOLUTION INTRAMUSCULAR; INTRAVENOUS
Status: DISCONTINUED | OUTPATIENT
Start: 2021-07-08 | End: 2021-07-08 | Stop reason: HOSPADM

## 2021-07-08 RX ORDER — PREGABALIN 50 MG/1
50 CAPSULE ORAL
Status: COMPLETED | OUTPATIENT
Start: 2021-07-08 | End: 2021-07-08

## 2021-07-08 RX ORDER — ROPIVACAINE HYDROCHLORIDE 5 MG/ML
INJECTION, SOLUTION EPIDURAL; INFILTRATION; PERINEURAL AS NEEDED
Status: DISCONTINUED | OUTPATIENT
Start: 2021-07-08 | End: 2021-07-08 | Stop reason: HOSPADM

## 2021-07-08 RX ORDER — LANOLIN ALCOHOL/MO/W.PET/CERES
1 CREAM (GRAM) TOPICAL 3 TIMES DAILY
Status: DISCONTINUED | OUTPATIENT
Start: 2021-07-08 | End: 2021-07-08 | Stop reason: HOSPADM

## 2021-07-08 RX ORDER — ONDANSETRON 2 MG/ML
4 INJECTION INTRAMUSCULAR; INTRAVENOUS
Status: DISCONTINUED | OUTPATIENT
Start: 2021-07-08 | End: 2021-07-08 | Stop reason: HOSPADM

## 2021-07-08 RX ORDER — ONDANSETRON 2 MG/ML
4 INJECTION INTRAMUSCULAR; INTRAVENOUS ONCE
Status: DISCONTINUED | OUTPATIENT
Start: 2021-07-08 | End: 2021-07-08 | Stop reason: HOSPADM

## 2021-07-08 RX ORDER — HYDROMORPHONE HYDROCHLORIDE 4 MG/1
2 TABLET ORAL
Status: DISCONTINUED | OUTPATIENT
Start: 2021-07-08 | End: 2021-07-08 | Stop reason: HOSPADM

## 2021-07-08 RX ORDER — KETAMINE HYDROCHLORIDE 10 MG/ML
INJECTION, SOLUTION INTRAMUSCULAR; INTRAVENOUS AS NEEDED
Status: DISCONTINUED | OUTPATIENT
Start: 2021-07-08 | End: 2021-07-08 | Stop reason: HOSPADM

## 2021-07-08 RX ORDER — SODIUM CHLORIDE 0.9 % (FLUSH) 0.9 %
5-40 SYRINGE (ML) INJECTION AS NEEDED
Status: DISCONTINUED | OUTPATIENT
Start: 2021-07-08 | End: 2021-07-08 | Stop reason: HOSPADM

## 2021-07-08 RX ORDER — FLUMAZENIL 0.1 MG/ML
0.2 INJECTION INTRAVENOUS
Status: DISCONTINUED | OUTPATIENT
Start: 2021-07-08 | End: 2021-07-08 | Stop reason: HOSPADM

## 2021-07-08 RX ORDER — ASPIRIN 81 MG/1
81 TABLET ORAL 2 TIMES DAILY
Qty: 42 TABLET | Refills: 0 | Status: SHIPPED | OUTPATIENT
Start: 2021-07-08 | End: 2021-07-29

## 2021-07-08 RX ORDER — KETOROLAC TROMETHAMINE 30 MG/ML
15 INJECTION, SOLUTION INTRAMUSCULAR; INTRAVENOUS EVERY 6 HOURS
Status: DISCONTINUED | OUTPATIENT
Start: 2021-07-08 | End: 2021-07-08 | Stop reason: HOSPADM

## 2021-07-08 RX ORDER — OXYCODONE HYDROCHLORIDE 5 MG/1
10 TABLET ORAL
Status: DISCONTINUED | OUTPATIENT
Start: 2021-07-08 | End: 2021-07-08

## 2021-07-08 RX ORDER — ACETAMINOPHEN 325 MG/1
650 TABLET ORAL EVERY 6 HOURS
Status: DISCONTINUED | OUTPATIENT
Start: 2021-07-08 | End: 2021-07-08 | Stop reason: HOSPADM

## 2021-07-08 RX ORDER — SODIUM CHLORIDE, SODIUM LACTATE, POTASSIUM CHLORIDE, CALCIUM CHLORIDE 600; 310; 30; 20 MG/100ML; MG/100ML; MG/100ML; MG/100ML
125 INJECTION, SOLUTION INTRAVENOUS CONTINUOUS
Status: DISCONTINUED | OUTPATIENT
Start: 2021-07-08 | End: 2021-07-08 | Stop reason: HOSPADM

## 2021-07-08 RX ORDER — HYDROMORPHONE HYDROCHLORIDE 2 MG/1
2 TABLET ORAL
Qty: 42 TABLET | Refills: 0 | Status: SHIPPED | OUTPATIENT
Start: 2021-07-08 | End: 2021-07-15

## 2021-07-08 RX ORDER — ONDANSETRON 2 MG/ML
INJECTION INTRAMUSCULAR; INTRAVENOUS AS NEEDED
Status: DISCONTINUED | OUTPATIENT
Start: 2021-07-08 | End: 2021-07-08 | Stop reason: HOSPADM

## 2021-07-08 RX ORDER — LIDOCAINE HYDROCHLORIDE 20 MG/ML
INJECTION, SOLUTION EPIDURAL; INFILTRATION; INTRACAUDAL; PERINEURAL AS NEEDED
Status: DISCONTINUED | OUTPATIENT
Start: 2021-07-08 | End: 2021-07-08 | Stop reason: HOSPADM

## 2021-07-08 RX ORDER — SODIUM CHLORIDE, SODIUM LACTATE, POTASSIUM CHLORIDE, CALCIUM CHLORIDE 600; 310; 30; 20 MG/100ML; MG/100ML; MG/100ML; MG/100ML
100 INJECTION, SOLUTION INTRAVENOUS CONTINUOUS
Status: DISCONTINUED | OUTPATIENT
Start: 2021-07-08 | End: 2021-07-08 | Stop reason: HOSPADM

## 2021-07-08 RX ORDER — CEFADROXIL 500 MG/1
500 CAPSULE ORAL 2 TIMES DAILY
Qty: 10 CAPSULE | Refills: 0 | Status: SHIPPED | OUTPATIENT
Start: 2021-07-08 | End: 2021-07-13

## 2021-07-08 RX ORDER — HYDROMORPHONE HYDROCHLORIDE 2 MG/ML
INJECTION, SOLUTION INTRAMUSCULAR; INTRAVENOUS; SUBCUTANEOUS AS NEEDED
Status: DISCONTINUED | OUTPATIENT
Start: 2021-07-08 | End: 2021-07-08 | Stop reason: HOSPADM

## 2021-07-08 RX ORDER — DEXAMETHASONE SODIUM PHOSPHATE 4 MG/ML
8 INJECTION, SOLUTION INTRA-ARTICULAR; INTRALESIONAL; INTRAMUSCULAR; INTRAVENOUS; SOFT TISSUE ONCE
Status: DISCONTINUED | OUTPATIENT
Start: 2021-07-08 | End: 2021-07-08

## 2021-07-08 RX ADMIN — METOCLOPRAMIDE 10 MG: 5 INJECTION, SOLUTION INTRAMUSCULAR; INTRAVENOUS at 12:15

## 2021-07-08 RX ADMIN — ROPIVACAINE HYDROCHLORIDE 25 ML: 5 INJECTION, SOLUTION EPIDURAL; INFILTRATION; PERINEURAL at 09:18

## 2021-07-08 RX ADMIN — DEXMEDETOMIDINE HYDROCHLORIDE 16 MCG: 100 INJECTION, SOLUTION INTRAVENOUS at 09:42

## 2021-07-08 RX ADMIN — FENTANYL CITRATE 100 MCG: 50 INJECTION, SOLUTION INTRAMUSCULAR; INTRAVENOUS at 09:15

## 2021-07-08 RX ADMIN — GLYCOPYRROLATE 0.2 MG: 0.2 INJECTION INTRAMUSCULAR; INTRAVENOUS at 09:32

## 2021-07-08 RX ADMIN — ROPIVACAINE HYDROCHLORIDE 10 ML: 5 INJECTION, SOLUTION EPIDURAL; INFILTRATION; PERINEURAL at 09:21

## 2021-07-08 RX ADMIN — SODIUM CHLORIDE 300 ML/HR: 900 INJECTION, SOLUTION INTRAVENOUS at 11:50

## 2021-07-08 RX ADMIN — SODIUM CHLORIDE, SODIUM LACTATE, POTASSIUM CHLORIDE, AND CALCIUM CHLORIDE 125 ML/HR: 600; 310; 30; 20 INJECTION, SOLUTION INTRAVENOUS at 07:29

## 2021-07-08 RX ADMIN — MIDAZOLAM 1 MG: 1 INJECTION INTRAMUSCULAR; INTRAVENOUS at 09:32

## 2021-07-08 RX ADMIN — LIDOCAINE HYDROCHLORIDE 80 MG: 20 INJECTION, SOLUTION EPIDURAL; INFILTRATION; INTRACAUDAL; PERINEURAL at 09:40

## 2021-07-08 RX ADMIN — ACETAMINOPHEN 1000 MG: 500 TABLET ORAL at 07:50

## 2021-07-08 RX ADMIN — CELECOXIB 400 MG: 100 CAPSULE ORAL at 07:50

## 2021-07-08 RX ADMIN — SODIUM CHLORIDE, SODIUM LACTATE, POTASSIUM CHLORIDE, AND CALCIUM CHLORIDE: 600; 310; 30; 20 INJECTION, SOLUTION INTRAVENOUS at 10:13

## 2021-07-08 RX ADMIN — PREGABALIN 50 MG: 50 CAPSULE ORAL at 07:50

## 2021-07-08 RX ADMIN — HYDROMORPHONE HYDROCHLORIDE 1 MG: 2 INJECTION, SOLUTION INTRAMUSCULAR; INTRAVENOUS; SUBCUTANEOUS at 09:40

## 2021-07-08 RX ADMIN — ONDANSETRON HYDROCHLORIDE 4 MG: 2 INJECTION INTRAMUSCULAR; INTRAVENOUS at 09:42

## 2021-07-08 RX ADMIN — MIDAZOLAM 2 MG: 1 INJECTION INTRAMUSCULAR; INTRAVENOUS at 09:15

## 2021-07-08 RX ADMIN — CEFAZOLIN 2 G: 1 INJECTION, POWDER, FOR SOLUTION INTRAVENOUS at 09:34

## 2021-07-08 RX ADMIN — KETAMINE HYDROCHLORIDE 50 MG: 10 INJECTION, SOLUTION INTRAMUSCULAR; INTRAVENOUS at 09:40

## 2021-07-08 RX ADMIN — SODIUM CHLORIDE, POTASSIUM CHLORIDE, SODIUM LACTATE AND CALCIUM CHLORIDE 1000 ML: 600; 310; 30; 20 INJECTION, SOLUTION INTRAVENOUS at 07:29

## 2021-07-08 RX ADMIN — TRANEXAMIC ACID 1950 MG: 650 TABLET ORAL at 07:17

## 2021-07-08 RX ADMIN — PROPOFOL 200 MG: 10 INJECTION, EMULSION INTRAVENOUS at 09:40

## 2021-07-08 RX ADMIN — HYDROMORPHONE HYDROCHLORIDE 2 MG: 4 TABLET ORAL at 13:02

## 2021-07-08 RX ADMIN — DEXAMETHASONE SODIUM PHOSPHATE 4 MG: 4 INJECTION, SOLUTION INTRAMUSCULAR; INTRAVENOUS at 08:00

## 2021-07-08 RX ADMIN — PANTOPRAZOLE SODIUM 40 MG: 40 TABLET, DELAYED RELEASE ORAL at 07:50

## 2021-07-08 RX ADMIN — KETOROLAC TROMETHAMINE 15 MG: 30 INJECTION, SOLUTION INTRAMUSCULAR at 12:16

## 2021-07-08 NOTE — PERIOP NOTES
Pt. Used restroom in pre-op area with assistance. Patient placed on Cm Paws for a minimum of 30 min in  Preop.

## 2021-07-08 NOTE — ANESTHESIA PROCEDURE NOTES
From: Madai Tcuker  To: Remberto Pendleton  Sent: 6/7/2021 8:43 AM CDT  Subject: Medication Question    Hi Kamaljit Kamara will need a refill on the Aricept - she is taking 10 mgs now... can you send in a script for 10 mgs/once a day? She is taking two 5mgs. now, to use them up.  She's all moved in [facility], 1 bedroom. She loves it. Should be easier to manage in general, than her big flat she lived in.     I'll schedule the colonoscopy this week.   Thanks,  ~Laura   [phone]   Peripheral Block    Start time: 7/8/2021 9:15 AM  End time: 7/8/2021 9:23 AM  Performed by: Sumi Kang DO  Authorized by: Sumi Kang DO       Pre-procedure:    Indications: at surgeon's request and post-op pain management    Preanesthetic Checklist: patient identified, risks and benefits discussed, site marked, timeout performed, anesthesia consent given and patient being monitored    Timeout Time: 09:15 EDT          Block Type:   Block Type:  IPACK  Laterality:  Left  Monitoring:  Standard ASA monitoring, continuous pulse ox, frequent vital sign checks, heart rate, oxygen and responsive to questions  Injection Technique:  Single shot  Procedures: ultrasound guided    Patient Position: supine  Prep: chlorhexidine    Location:  Lower thigh (lower lateral thigh)  Needle Type:  Stimuplex  Needle Gauge:  21 G  Needle Localization:  Ultrasound guidance    Assessment:  Number of attempts:  1  Injection Assessment:  Incremental injection every 5 mL, local visualized surrounding nerve on ultrasound, negative aspiration for blood, no paresthesia, no intravascular symptoms and ultrasound image on chart  Patient tolerance:  Patient tolerated the procedure well with no immediate complications

## 2021-07-08 NOTE — ANESTHESIA PROCEDURE NOTES
Peripheral Block    Start time: 7/8/2021 9:15 AM  End time: 7/8/2021 9:23 AM  Performed by: Mark Tillman DO  Authorized by: Mark Tillman DO       Pre-procedure: Indications: at surgeon's request and post-op pain management    Preanesthetic Checklist: patient identified, risks and benefits discussed, site marked, timeout performed, anesthesia consent given and patient being monitored    Timeout Time: 09:15 EDT          Block Type:   Block Type:   Adductor canal block  Laterality:  Left  Monitoring:  Standard ASA monitoring, continuous pulse ox, frequent vital sign checks, heart rate, oxygen and responsive to questions  Injection Technique:  Single shot  Procedures: ultrasound guided    Patient Position: supine (frog leg)  Prep: chlorhexidine    Location:  Mid thigh  Needle Type:  Stimuplex  Needle Gauge:  21 G  Needle Localization:  Ultrasound guidance    Assessment:  Number of attempts:  1  Injection Assessment:  Incremental injection every 5 mL, local visualized surrounding nerve on ultrasound, negative aspiration for blood, no paresthesia, no intravascular symptoms and ultrasound image on chart  Patient tolerance:  Patient tolerated the procedure well with no immediate complications

## 2021-07-08 NOTE — INTERVAL H&P NOTE
Update History & Physical    The Patient's History and Physical of July 7, 2021 was reviewed with the patient and I examined the patient. There was no change. The surgical site was confirmed by the patient and me. Plan:  The risk, benefits, expected outcome, and alternative to the recommended procedure have been discussed with the patient. Patient understands and wants to proceed with the procedure.     Electronically signed by Lew Garcia MD on 7/8/2021 at 7:04 AM

## 2021-07-08 NOTE — DISCHARGE SUMMARY
[address] [facility] [address]     DISCHARGE SUMMARY     PATIENT: Demarcus Don     MRN: [id number]   ADMIT DATE: 2021   BILL[facility]   DISCHARGE DATE: 2021     ATTENDING: Joss Nicoel MD   DICTATING: PRIMITIVO Tenorio         ADMISSION DIAGNOSIS: Osteoarthritis of left knee [M17.12]    DISCHARGE DIAGNOSIS: Status post LEFT TOTAL KNEE ARTHROPLASTY    HISTORY OF PRESENT ILLNESS: The patient is a 78y.o. year-old female   with ongoing left knee pain secondary to osteoarthritis of her left knee. The patient's pain has persisted and progressed despite conservative treatments and therapies. The patient has at this time opted for surgical intervention. PAST MEDICAL HISTORY:   Past Medical History:   Diagnosis Date    Arthritis     Nausea & vomiting     Osteoarthritis of left knee 2021    Osteopenia        PAST SURGICAL HISTORY:   Past Surgical History:   Procedure Laterality Date    HX GYN      laparotomy    HX ORTHOPAEDIC Left     wrist        ALLERGIES: No Known Allergies     CURRENT MEDICATIONS:  A list of medications prior to the time of admission include:  Prior to Admission medications    Medication Sig Start Date End Date Taking? Authorizing Provider   aspirin delayed-release 81 mg tablet Take 1 Tablet by mouth two (2) times a day for 21 days. 21 Yes Moises Cade PA   cefadroxil (DURICEF) 500 mg capsule Take 1 Capsule by mouth two (2) times a day for 5 days. 21 Yes Claritza Cade PA   HYDROmorphone (DILAUDID) 2 mg tablet Take 1 Tablet by mouth every four (4) hours as needed for Pain for up to 7 days. Max Daily Amount: 12 mg. 7/8/21 7/15/21 Yes Moises Cade PA   meloxicam (Mobic) 7.5 mg tablet Take 1 Tablet by mouth two (2) times a day for 14 days. 21 Yes Moises Cdae PA   B.infantis-B.ani-B.long-B.bifi (Probiotic 4X) 10-15 mg TbEC Take 1 Tablet by mouth daily.    Yes Provider, Historical   Multivitamins with Fluoride (MULTI-VITAMIN PO) Multi Vitamin 1 tab daily 7/9/21  Yes Provider, Historical   calcium carbonate/vitamin D3 (CALCIUM 500 + D PO) Calcium 500 + D   Yes Provider, Historical       FAMILY HISTORY: History reviewed. No pertinent family history. SOCIAL HISTORY:   Social History     Socioeconomic History    Marital status:      Spouse name: Not on file    Number of children: Not on file    Years of education: Not on file    Highest education level: Not on file   Tobacco Use    Smoking status: Never Smoker    Smokeless tobacco: Never Used   Vaping Use    Vaping Use: Never used   Substance and Sexual Activity    Alcohol use: Yes     Alcohol/week: 5.0 standard drinks     Types: 3 Glasses of wine, 2 Cans of beer per week    Drug use: Never     Social Determinants of Health     Financial Resource Strain:     Difficulty of Paying Living Expenses:    Food Insecurity:     Worried About Running Out of Food in the Last Year:     Ran Out of Food in the Last Year:    Transportation Needs:     Lack of Transportation (Medical):     Lack of Transportation (Non-Medical):    Physical Activity:     Days of Exercise per Week:     Minutes of Exercise per Session:    Stress:     Feeling of Stress :    Social Connections:     Frequency of Communication with Friends and Family:     Frequency of Social Gatherings with Friends and Family:     Attends Zoroastrian Services: Active Member of Clubs or Organizations:     Attends Club or Organization Meetings:     Marital Status:        REVIEW OF SYSTEMS: All review of systems are negative. PHYSICAL EXAMINATION: For a detailed physical exam, please refer to the patient's chart. HOSPITAL COURSE: The patient was taken to surgery the day of admission. she underwent a left total knee replacement. Operative course was benign. Estimated blood loss was approximately 150 cc.  The patient was taken to the PACU in stable condition and was later taken to the floor in stable condition. During her hospital stay, the patient progressed well with physical therapy and occupational therapy, adherent to instructions. she had been cleared by physical therapy with stair training. she was placed on Aspirin for DVT prophylaxis. her pain has been well controlled with oral pain medications. her vitals have remained stable. she has also remained hemodynamically stable. The patient has been recommended for discharge home. DISCHARGE INSTRUCTIONS: The patient is to be discharged home with the following medication changes:     Discharge Medication List as of 7/8/2021  3:22 PM        START taking these medications    Details   aspirin delayed-release 81 mg tablet Take 1 Tablet by mouth two (2) times a day for 21 days. , Normal, Disp-42 Tablet, R-0      cefadroxil (DURICEF) 500 mg capsule Take 1 Capsule by mouth two (2) times a day for 5 days. , Normal, Disp-10 Capsule, R-0      HYDROmorphone (DILAUDID) 2 mg tablet Take 1 Tablet by mouth every four (4) hours as needed for Pain for up to 7 days. Max Daily Amount: 12 mg., Normal, Disp-42 Tablet, R-0           CONTINUE these medications which have CHANGED    Details   meloxicam (Mobic) 7.5 mg tablet Take 1 Tablet by mouth two (2) times a day for 14 days. , Normal, Disp-28 Tablet, R-0           CONTINUE these medications which have NOT CHANGED    Details   B.infantis-B.ani-B.long-B.bifi (Probiotic 4X) 10-15 mg TbEC Take  by mouth daily. , Historical Med      Multivitamins with Fluoride (MULTI-VITAMIN PO) Multi Vitamin, Historical Med      calcium carbonate/vitamin D3 (CALCIUM 500 + D PO) Calcium 500 + D, Historical Med           STOP taking these medications       red yeast rice extract 600 mg cap Comments:   Reason for Stopping:         cartilage/collagen II/hyaluron (MOVE FREE ULTRA PO) Comments:   Reason for Stopping:         estrogen, conjugated,-medroxyPROGESTERone (PREMPRO) 0.3-1.5 mg tab Comments:   Reason for Stopping:         KRILL OIL PO Comments:   Reason for Stopping:                   The patient is to continue at home with home physical therapy 3 times a week to work on gait training, range of motion, strengthening, and weightbearing exercises as tolerated on the left lower extremity. The patient is to progress from a walker to a cane to complete total weightbearing as tolerable. The patient is to continue to keep her incision dry. The patient is to followup with Dr. Keyur Greenberg, Nena Izaguirre PA-C and/or Darren Barton PA-C in the office approximately 10-14 days status post for x-rays and further evaluation.     Twylla Cranker, PA  7/12/2021

## 2021-07-08 NOTE — PROGRESS NOTES
Patient transferred to room 230 via stretcher in stable condition, VSS.     07/08/21 1150   Modified Taiwo Score   Activity 2   Respiration 2   Circulation 2   Consciousness 2   O2 Saturation 2   Taiwo Score 10   Vital Signs   Temp 97 °F (36.1 °C)   Temp Source Temporal   Pulse (Heart Rate) 88   Resp Rate 16   /80   Oxygen Therapy   O2 Sat (%) 97 %   O2 Device None (Room air)   Airway   Airway Patent

## 2021-07-08 NOTE — ANESTHESIA POSTPROCEDURE EVALUATION
Post-Anesthesia Evaluation & Assessment    Visit Vitals  /78   Pulse (!) 104   Temp 36.9 °C (98.5 °F)   Resp 15   Ht 5' (1.524 m)   Wt 67.8 kg (149 lb 8 oz)   SpO2 98%   BMI 29.20 kg/m²       Nausea/Vomiting: Controlled. Post-operative hydration adequate. Pain Scale 1: Numeric (0 - 10) (07/08/21 1101)  Pain Intensity 1: 0 (07/08/21 1101)   Managed    Pain score at or below stated goal level. Mental status & Level of consciousness: alert and oriented x 3    Neurological status: moves all extremities, sensation grossly intact    Pulmonary status: airway patent, adequate oxygenation. Complications related to anesthesia: none    Patient has met all PACU discharge requirements.       Kristie Butterfield DO

## 2021-07-08 NOTE — PROGRESS NOTES
Same Day Discharge    1155 - Patient arrives to unit at this time. Dual skin assessment completed with Ada Cardona RN, no abnormalities noted other than surgical incision to L knee. Mepilex dressing CDI. Patient oriented to room to include use of call bell, meal ordering, and use of incentive spirometer, patient able to get IS to 1750. Patient instructed to order lunch and given explanation of home for dinner plan. Phone and call bell left within reach. Educated on pain medication availability and possible side effects, as well as need to call for assistance before getting out of bed. Patient verbalized understanding.      Symptoms present on arrival:  [x] Pain 5/10   [] Medicated--will medicate after nausea controlled  [x] Nausea   [] Medicated--will medicate   [] Hypotension/Hypertension   [] Provider notified

## 2021-07-08 NOTE — PROGRESS NOTES
Problem: Self Care Deficits Care Plan (Adult)  Goal: *Acute Goals and Plan of Care (Insert Text)  Description: Initial Occupational Therapy Goals (7/8/2021) Within 7 day(s):    1. Patient will perform grooming standing sinkside with supervision for increased independence with ADLs. 2. Patient will perform LB dressing with supervision & A/E PRN for increased independence with ADLs. 3. Patient will perform toilet transfer with supervision for increased independence with ADLs. 4. Patient will perform all aspects of toileting with supervision for increased independence with ADLs. 5. Patient will independently apply energy conservation techniques with 1 verbal cue(s)for increased independence with ADLs. 6. Patient will perform bathroom mobility with supervision for increased independence/safety with ADLs. Outcome: Progressing Towards Goal   OCCUPATIONAL THERAPY EVALUATION    Patient: Demarcus Don (77 y.o. female)  Date: 7/8/2021  Primary Diagnosis: Osteoarthritis of left knee [M17.12]  Procedure(s) (LRB):  LEFT TOTAL KNEE REPLACEMENT (ORTHO DEVELOPMENT) (Left) Day of Surgery   Precautions: Fall, WBAT  PLOF: pt independent for ADLs/functional mobility    ASSESSMENT AND RECOMMENDATIONS:  Based on the objective data described below, the patient presents with LLE decreased ROM and strength affecting LE ADLs. Pt found seated in recliner chair, vitals assessed and WNL, pt reporting pain 2/10. Educated pt on proper body mechanics s/p TKR including adaptive strategies for lower body ADLs. Pt completed upper body dressing with supervision seated in chair. Pt able to thread B feet through underwear/shorts without assist, and CGA when standing to pull up to waist. Pt CGA/SBA for STS/bathroom mobility with vc for safe use of RW. Pt ambulated back to recliner, ice applied to L knee. Spouse present during session for education on home safety.  Provided opportunity for pt to voice questions on ADL performance when home, pt has no further concerns. Patient will benefit from skilled Occupational Therapy intervention to maximize safety/independence with ADLs at d/c.    Education: Reviewed home safety, body mechanics, importance of moving every hour to prevent joint stiffness, role of ice for edema/pain control, Rolling Walker management/safety, and adaptive dressing techniques with patient verbalizing  understanding at this time     Patient will benefit from skilled intervention to address the above impairments. Patient's rehabilitation potential is considered to be Good  Factors which may influence rehabilitation potential include:   [x]             None noted  []             Mental ability/status  []             Medical condition  []             Home/family situation and support systems  []             Safety awareness  []             Pain tolerance/management  []             Other:        PLAN :  Recommendations and Planned Interventions:   [x]               Self Care Training                  [x]      Therapeutic Activities  [x]               Functional Mobility Training   []      Cognitive Retraining  []               Therapeutic Exercises           []      Endurance Activities  []               Balance Training                    []      Neuromuscular Re-Education  []               Visual/Perceptual Training     [x]      Home Safety Training  [x]               Patient Education                   [x]      Family Training/Education  []               Other (comment):    Frequency/Duration: Patient will be followed by Occupational Therapy 1-2 times per day/4-7 days per week to address goals. Discharge Recommendations: Home health with adult supervision at least 24 hours after d/c  Further Equipment Recommendations for Discharge: N/A     SUBJECTIVE:   Patient stated i'm pretty good.     OBJECTIVE DATA SUMMARY:     Past Medical History:   Diagnosis Date    Arthritis     Nausea & vomiting     Osteoarthritis of left knee 7/7/2021    Osteopenia Past Surgical History:   Procedure Laterality Date    HX GYN  1984    laparotomy    HX ORTHOPAEDIC Left 1990s    wrist      Barriers to Learning/Limitations: yes;  physical and altered mental status (i.e.Sedation, Confusion)  Compensate with: visual, verbal, tactile, kinesthetic cues/model    Home Situation/Prior Level of Function:   Home Situation  Home Environment: Private residence  # Steps to Enter: 3  Rails to Enter: Yes  Hand Rails : Bilateral (wide)  One/Two Story Residence: One story  Living Alone: No  Support Systems: Spouse/Significant Other/Partner  Patient Expects to be Discharged to[de-identified] House  Current DME Used/Available at Home: Walker, rolling, Grab bars  Tub or Shower Type: Shower (seat)  []  Right hand dominant   []  Left hand dominant    Cognitive/Behavioral Status:  Neurologic State: Alert  Orientation Level: Oriented to person;Oriented to place;Oriented to situation  Cognition: Follows commands  Safety/Judgement: Awareness of environment    Skin: L knee incision w/ Mepilex   Edema: compression hose in place & applied ice     Coordination: BUE  Coordination: Within functional limits  Fine Motor Skills-Upper: Left Intact; Right Intact    Gross Motor Skills-Upper: Left Intact; Right Intact    Balance:  Sitting: Intact  Standing: Intact; With support    Strength: BUE  Strength: Generally decreased, functional    Tone & Sensation:BUE  Tone: Normal  Sensation: Impaired (L knee)    Range of Motion: BUE  AROM: Generally decreased, functional    Functional Mobility and Transfers for ADLs:  Bed Mobility:  Scooting: Stand-by assistance    Transfers:  Sit to Stand: Contact guard assistance (vc)    ADL Assessment:  Feeding: Independent  Oral Facial Hygiene/Grooming: Stand-by assistance  Bathing: Contact guard assistance  Upper Body Dressing: Supervision  Lower Body Dressing: Contact guard assistance  Toileting: Stand by assistance    ADL Intervention:  Upper Body Dressing Assistance  Dressing Assistance: Supervision  Pullover Shirt: Supervision    Lower Body Dressing Assistance  Dressing Assistance: Contact guard assistance  Underpants: Contact guard assistance  Pants With Elastic Waist: Contact guard assistance  Leg Crossed Method Used: No  Position Performed: Seated in chair  Cues: Verbal cues provided;Visual cues provided    Cognitive Retraining  Safety/Judgement: Awareness of environment    Pain:  Pain level pre-treatment: 2/10  Pain level post-treatment: 2/10  Pain Intervention(s): Medication administer by Nursing (see MAR); Rest, Ice, Repositioning   Response to intervention: Nurse notified, see doc flow     Activity Tolerance:   Fair. Patient able to stand ~5 minute(s). Patient able to complete ADLs with intermittent rest breaks. Patient limited by pain, strength, ROM. Patient unsteady. Please refer to the flowsheet for vital signs taken during this treatment. After treatment:   [x]  Patient left in no apparent distress sitting up in chair  []  Patient sitting on EOB  []  Patient left in no apparent distress in bed  [x]  Call bell left within reach  [x]  Nursing notified  [x]  Caregiver present  [x]  Ice applied  []  SCD's on while back in bed  [] Bed alarm activated    COMMUNICATION/EDUCATION:   Communication/Collaboration:  [x]       Role of Occupational Therapy in the acute care setting. [x]      Home safety education was provided and the patient/caregiver indicated understanding. [x]      Patient/family have participated as able in goal setting and plan of care. [x]      Patient/family agree to work toward stated goals and plan of care. []      Patient understands intent and goals of therapy, but is neutral about his/her participation. []      Patient is unable to participate in plan of care at this time.     Thank you for this referral.  Manuel Villalpando OTR/L  Time Calculation: 23 mins    Eval Complexity: History: MEDIUM Complexity : Expanded review of history including physical, cognitive and psychosocial  history ; Examination: LOW Complexity : 1-3 performance deficits relating to physical, cognitive , or psychosocial skils that result in activity limitations and / or participation restrictions ;    Decision Making:LOW Complexity : No comorbidities that affect functional and no verbal or physical assistance needed to complete eval tasks

## 2021-07-08 NOTE — PROGRESS NOTES
Problem: Mobility Impaired (Adult and Pediatric)  Goal: *Acute Goals and Plan of Care (Insert Text)  Description: PT goals to be met in 1 day:  Pt will be able to perform supine<>sit SBA for transfers at home. Pt will be able to perform sit<>stand SBA for increased ability to transfer at home safely. Pt will be able to participate in gt training >120' w/ RW, WBAT, GB and CGA/SBA for improved ability in home upon d/c. Pt will be able to perform stair training step to pattern, B/U rail and CGA to obtain safe entry into home upon d/c. Pt will be educated regarding HEP per MD protocol for optimal AROM/strength outcomes. Note: [x]  Patient has met MD mobilization critieria for d/c home   [x]  Recommend HH with 24 hour adult care   []  Benefit from additional acute PT session to address:      PHYSICAL THERAPY EVALUATION    Patient: Graciela Hughes (77 y.o. female)  Date: 7/8/2021  Primary Diagnosis: Osteoarthritis of left knee [M17.12]  Procedure(s) (LRB):  LEFT TOTAL KNEE REPLACEMENT (ORTHO DEVELOPMENT) (Left) Day of Surgery   Precautions:   Fall, WBAT    PLOF: Independent w/o use of AD    ASSESSMENT :  Based on the objective data described below, the patient presents with decreased mobility in regards to bed mobility, transfers, gt quality and tolerance, balance, stair negotiation and safety due to L TKA surgery. Decreased AROM of L knee, dec strength of L knee, pain in L knee, dec sensation of L knee also impacting pt functional mobility. Pt rating pain on numerical pain scale pre/post and during session 2/10. Pt sitting in recliner upon arrival.  Pt and  ed regarding mobility safety, WB, HEP, ice application/use, elevation, environmental safety and home safe techniques. Pt able to perform sit<>stand w/ CGA/SBA. Safety vc required throughout session to reinforce safety. Pt able to participate in gt training using RW, GB, WBAT and CGA w/ antalgic gt pattern.   Pt was able to participate in stair training using step to pattern, B rails and CGA. Answered questions by pt and  in regards to PT and mobility. Pt left sitting in recliner w/ all needs within reach and ice pack to L knee. Nurse Sue aware of session and outcomes. Recommend HHPT with responsible adult care at least 24 hours upon hospital d/c. Patient will benefit from skilled intervention to address the above impairments. Patient's rehabilitation potential is considered to be Good  Factors which may influence rehabilitation potential include:   []         None noted  []         Mental ability/status  []         Medical condition  []         Home/family situation and support systems  []         Safety awareness  [x]         Pain tolerance/management  []         Other:      PLAN :  Recommendations and Planned Interventions:   [x]           Bed Mobility Training             []    Neuromuscular Re-Education  [x]           Transfer Training                   []    Orthotic/Prosthetic Training  [x]           Gait Training                          [x]    Modalities  [x]           Therapeutic Exercises           [x]    Edema Management/Control  [x]           Therapeutic Activities            [x]    Family Training/Education  [x]           Patient Education  []           Other (comment):    Frequency/Duration: Patient will be followed by physical therapy 1-2 times per day/4-7 days per week to address goals. Discharge Recommendations: Home Health  Further Equipment Recommendations for Discharge: N/A     SUBJECTIVE:   Patient stated I am ready.     OBJECTIVE DATA SUMMARY:     Past Medical History:   Diagnosis Date    Arthritis     Nausea & vomiting     Osteoarthritis of left knee 7/7/2021    Osteopenia      Past Surgical History:   Procedure Laterality Date    HX GYN  1984    laparotomy    HX ORTHOPAEDIC Left 1990s    wrist      Barriers to Learning/Limitations: yes;  anesthesia  Compensate with: Visual Cues, Verbal Cues, and Tactile Cues  Home Situation:  Home Situation  Home Environment: Private residence  # Steps to Enter: 3  Rails to Enter: Yes  Hand Rails : Bilateral (wide)  One/Two Story Residence: One story  Living Alone: No  Support Systems: Spouse/Significant Other/Partner  Patient Expects to be Discharged to[de-identified] House  Current DME Used/Available at Home: Walker, rolling, Grab bars  Tub or Shower Type: Shower (seat)  Critical Behavior:  Neurologic State: Alert  Orientation Level: Oriented to person;Oriented to place;Oriented to situation  Cognition: Follows commands  Safety/Judgement: Awareness of environment  Psychosocial  Patient Behaviors: Calm; Cooperative  Family  Behaviors: Calm;Supportive  Purposeful Interaction: Yes  Pt Identified Daily Priority: Clinical issues (comment)  Caritas Process: Nurture loving kindness  Caring Interventions: Reassure  Reassure: Therapeutic listening  Skin Condition/Temp: Dry;Warm  Family  Behaviors: Calm;Supportive  Skin Integrity: Incision (comment) (L knee)  Skin Integumentary  Skin Color: Appropriate for ethnicity  Skin Condition/Temp: Dry;Warm  Skin Integrity: Incision (comment) (L knee)  Strength:    Strength: Generally decreased, functional  Tone & Sensation:   Tone: Normal  Sensation: Impaired (L knee)  Range Of Motion:  AROM: Generally decreased, functional  Posture:  Functional Mobility:  Bed Mobility:  Scooting: Stand-by assistance  Transfers:  Sit to Stand: Contact guard assistance;Stand-by assistance (vc)  Stand to Sit: Contact guard assistance;Stand-by assistance (vc)  Balance:   Sitting: Intact  Standing: Intact; With support  Ambulation/Gait Training:  Distance (ft): 120 Feet (ft)  Assistive Device: Walker, rolling;Gait belt  Ambulation - Level of Assistance: Contact guard assistance (vc)  Gait Abnormalities: Antalgic;Decreased step clearance; Step to gait  Left Side Weight Bearing: As tolerated  Base of Support: Shift to right  Stance: Left decreased  Speed/Rena: Slow  Step Length: Left shortened;Right shortened  Swing Pattern: Left asymmetrical;Right asymmetrical  Interventions: Safety awareness training; Tactile cues; Verbal cues; Visual/Demos  Stairs:  Number of Stairs Trained: 5  Stairs - Level of Assistance: Contact guard assistance (vc)  Rail Use: Both     Therapeutic Exercises:   Encouraged HEP  Pain:  Pain level pre-treatment: 2/10   Pain level post-treatment: 2/10   Pain Intervention(s) : Medication (see MAR); Rest, Ice, Repositioning  Response to intervention: Nurse notified, See doc flow    Activity Tolerance:   Fair   Please refer to the flowsheet for vital signs taken during this treatment. After treatment:   [x]         Patient left in no apparent distress sitting up in chair  []         Patient left in no apparent distress in bed  [x]         Call bell left within reach  [x]         Nursing notified  [x]          present  []         Bed alarm activated  []         SCDs applied    COMMUNICATION/EDUCATION:   [x]         Role of Physical Therapy in the acute care setting. [x]         Fall prevention education was provided and the patient/caregiver indicated understanding. [x]         Patient/family have participated as able in goal setting and plan of care. [x]         Patient/family agree to work toward stated goals and plan of care. []         Patient understands intent and goals of therapy, but is neutral about his/her participation. []         Patient is unable to participate in goal setting/plan of care: ongoing with therapy staff.  []         Other:     Thank you for this referral.  María Reeder, PT   Time Calculation: 23 mins      Eval Complexity: History: HIGH Complexity :3+ comorbidities / personal factors will impact the outcome/ POC Exam:MEDIUM Complexity : 3 Standardized tests and measures addressing body structure, function, activity limitation and / or participation in recreation  Presentation: LOW Complexity : Stable, uncomplicated  Clinical Decision Making:Low Complexity    Overall Complexity:LOW

## 2021-07-08 NOTE — DISCHARGE INSTRUCTIONS
[address] [facility] [address] Medicine   Patient Discharge Instructions    Luan Sneed / [id number] : 1955    Admitted 2021 Discharged: 2021     IF YOU HAVE ANY PROBLEMS ONCE YOU ARE AT HOME CALL THE FOLLOWING NUMBERS:   Main office number: [phone]    Your follow up appointment to see either Dr. Ros Matos PA-C, or [facility] NIKIA as scheduled in 2 weeks. If you are unsure of your appointment date call the office at [phone]. Medication Instructions     · Resume your home medictions as directed, you may have directed not to resume supplements until after your follow up. · A prescription for pain medication has been given   · It is important that you take the medication exactly as they are prescribed. · Keep your medication in the bottles provided by the pharmacist and keep a list of the medication names, dosages, and times to be taken in your wallet. · Do not take other medications without consulting your doctor. What to do at [address] [place] [address] Ave your prehospital diet. If you have excessive nausea or vomitting call your doctor's office. Be sure to maintain adequate fluid intake. Some pain medications may cause constipation. Remember to drink fluids, stay as active as possible, and eat plenty of fiber-rich foods. Begin In-Home Physical Therapy; 3 times a week to work on gait training, range of motion, strengthening, and weight bearing exercises as tolerable. Continue to use your walker or cane when walking. May progress from the walker to a cane to complete total bearing as tolerable. Patient may shower. Wrap incision with plastic wrap/covering to prevent incision from getting wet. Avoid complete immersion. YOUR DRESSING SHOULD BE CHANGED BY YOUR HOME HEALTH NURSE 5-7 AFTER SURGERY ACCORDING TO THE DATE WRITTEN ON YOUR DRESSING.       When to Call    - Call if you have a temperature greater then 101  - Unable to keep food down  - Are unable to bear any wieght   - Need a pain medication refill     Information obtained by :  I understand that if any problems occur once I am at home I am to contact my physician. I understand and acknowledge receipt of the instructions indicated above.                                                                                                                                            Physician's or R.N.'s Signature                                                                  Date/Time                                                                                                                                              Patient or Representative Signature                                                          Date/Time

## 2021-07-08 NOTE — ROUTINE PROCESS
1137  TRANSFER - IN REPORT:    Verbal report received from [address] on [address]  being received from PACU for routine post - op      Report consisted of patients Situation, Background, Assessment and   Recommendations(SBAR). Information from the following report(s) SBAR, Kardex, OR Summary, Intake/Output, MAR, and Recent Results was reviewed with the receiving nurse. Opportunity for questions and clarification was provided. Assessment will be completed upon patients arrival to unit and care assumed.

## 2021-07-08 NOTE — PERIOP NOTES
TRANSFER - OUT REPORT:    Verbal report given to [facility] RN on Demarcus Don  being transferred to [facility] for routine post - op       Report consisted of patients Situation, Background, Assessment and   Recommendations(SBAR). Information from the following report(s) SBAR, OR Summary, Procedure Summary, Intake/Output and MAR was reviewed with the receiving nurse. Lines:   Peripheral IV 07/08/21 Right Forearm (Active)   Site Assessment Clean, dry, & intact 07/08/21 1101   Phlebitis Assessment 0 07/08/21 1101   Infiltration Assessment 0 07/08/21 1101   Dressing Status Clean, dry, & intact 07/08/21 1101   Dressing Type Transparent;Tape 07/08/21 1101   Hub Color/Line Status Green; Infusing;Patent 07/08/21 1101      Date 07/07/21 0700 - 07/08/21 0659(Not Admitted) 07/08/21 0700 - 07/09/21 0659   Shift 6120-3451 [id number] 24 Hour Total 3121-1185 2443-0146 24 Hour Total   INTAKE   I.V.    2500  2500     I.V.    1200  1200     Volume (lactated Ringers infusion)    1300  1300   Shift Total(mL/kg)    2500(36.9)  2500(36.9)   OUTPUT   Urine           Urine Occurrence(s)    1 x  1 x   Blood    50  50     Estimated Blood Loss    50  50   Shift Total(mL/kg)    50(0.7)  50(0.7)   NET    2450  2450   Weight (kg)    67.8 67.8 67.8       Opportunity for questions and clarification was provided.       Patient transported with:   Registered Nurse

## 2021-07-08 NOTE — PROGRESS NOTES
1155  Patient ambulated to recliner. 1240  Paged Lizett LANGFORD. Patient requesting dilaudid instead of roxicodone. She stated roxicodone does not alleviate pain and only makes her nauseous. 1238  Patient ambulated to bathroom. 1445  Patient ambulated to bathroom. 1505  Patient ambulating in cunningham. 1513  Dual AVS reviewed with Rachel NEAL RN. All medications reviewed individually with patient. Opportunities for questions and concerns provided. Patient to be discharged via (car, transport, ambulance). Patient's armband appropriately discarded.

## 2021-07-08 NOTE — PROGRESS NOTES
9986  Progression of Symptoms:    [x] Pain 2/10   [x] Improvement post medication administration   [] No improvement, provider notified   [x] Nausea   [x] Improvement post medication administration--resolved  [] No improvement, provider notified   [] Hypotension/Hypertension   [] Improved post medication administration   [] No improvement, provider notified     Readiness for Discharge:  [x] Vital signs stable  [x] + Voiding  [x] Wound intact, drainage minimal  [x] Tolerating PO intake  [] Cleared by PT (OT if applicable)  [] Discharge education completed with patient and family member to specifically include   [] Recommended stool softener  [] Proper elevation visual demonstration

## 2021-07-08 NOTE — ANESTHESIA PREPROCEDURE EVALUATION
Relevant Problems   No relevant active problems       Anesthetic History     PONV   Pertinent negatives: No increased risk of difficult airway, pseudocholinesterase deficiency, malignant hyperthermia and history of awareness of surgery under anesthesia  Comments: IV meds worked well in past     Review of Systems / Medical History  Patient summary reviewed, nursing notes reviewed and pertinent labs reviewed    Pulmonary  Within defined limits                 Neuro/Psych   Within defined limits           Cardiovascular  Within defined limits                Exercise tolerance: >4 METS     GI/Hepatic/Renal  Within defined limits              Endo/Other        Arthritis  Pertinent negatives: No diabetes, hypothyroidism, hyperthyroidism, obesity and blood dyscrasia   Other Findings              Physical Exam    Airway  Mallampati: II  TM Distance: 4 - 6 cm  Neck ROM: normal range of motion   Mouth opening: Normal     Cardiovascular  Regular rate and rhythm,  S1 and S2 normal,  no murmur, click, rub, or gallop             Dental      Comments: Missing one lower left molar   Pulmonary  Breath sounds clear to auscultation               Abdominal  GI exam deferred       Other Findings            Anesthetic Plan    ASA: 1  Anesthesia type: general and regional - femoral single shot and IPACK block          Induction: Intravenous  Anesthetic plan and risks discussed with: Patient and Spouse      GA/LMA with adductor canal and IPAK blocks for postop pain control.   Spinal discussed but pt prefers GA

## 2021-07-09 ENCOUNTER — HOME CARE VISIT (OUTPATIENT)
Dept: HOME HEALTH SERVICES | Facility: HOME HEALTH | Age: 66
End: 2021-07-09

## 2021-07-09 ENCOUNTER — HOME CARE VISIT (OUTPATIENT)
Dept: SCHEDULING | Facility: HOME HEALTH | Age: 66
End: 2021-07-09
Payer: MEDICARE

## 2021-07-09 VITALS
TEMPERATURE: 97.5 F | DIASTOLIC BLOOD PRESSURE: 68 MMHG | HEART RATE: 73 BPM | OXYGEN SATURATION: 99 % | SYSTOLIC BLOOD PRESSURE: 122 MMHG | RESPIRATION RATE: 16 BRPM

## 2021-07-09 PROCEDURE — A6213 FOAM DRG >16<=48 SQ IN W/BDR: HCPCS

## 2021-07-09 PROCEDURE — 3331090002 HH PPS REVENUE DEBIT

## 2021-07-09 PROCEDURE — G0151 HHCP-SERV OF PT,EA 15 MIN: HCPCS

## 2021-07-09 PROCEDURE — 400013 HH SOC

## 2021-07-09 PROCEDURE — 3331090001 HH PPS REVENUE CREDIT

## 2021-07-09 PROCEDURE — 400018 HH-NO PAY CLAIM PROCEDURE

## 2021-07-10 PROCEDURE — 3331090002 HH PPS REVENUE DEBIT

## 2021-07-10 PROCEDURE — 3331090001 HH PPS REVENUE CREDIT

## 2021-07-11 PROCEDURE — 3331090002 HH PPS REVENUE DEBIT

## 2021-07-11 PROCEDURE — 3331090001 HH PPS REVENUE CREDIT

## 2021-07-12 ENCOUNTER — HOME CARE VISIT (OUTPATIENT)
Dept: SCHEDULING | Facility: HOME HEALTH | Age: 66
End: 2021-07-12
Payer: MEDICARE

## 2021-07-12 PROCEDURE — G0157 HHC PT ASSISTANT EA 15: HCPCS

## 2021-07-12 PROCEDURE — 3331090002 HH PPS REVENUE DEBIT

## 2021-07-12 PROCEDURE — 3331090001 HH PPS REVENUE CREDIT

## 2021-07-13 PROCEDURE — 3331090001 HH PPS REVENUE CREDIT

## 2021-07-13 PROCEDURE — 3331090002 HH PPS REVENUE DEBIT

## 2021-07-14 ENCOUNTER — HOME CARE VISIT (OUTPATIENT)
Dept: SCHEDULING | Facility: HOME HEALTH | Age: 66
End: 2021-07-14
Payer: MEDICARE

## 2021-07-14 PROCEDURE — 3331090001 HH PPS REVENUE CREDIT

## 2021-07-14 PROCEDURE — G0157 HHC PT ASSISTANT EA 15: HCPCS

## 2021-07-14 PROCEDURE — 3331090002 HH PPS REVENUE DEBIT

## 2021-07-15 PROCEDURE — 3331090002 HH PPS REVENUE DEBIT

## 2021-07-15 PROCEDURE — 3331090001 HH PPS REVENUE CREDIT

## 2021-07-16 ENCOUNTER — HOME CARE VISIT (OUTPATIENT)
Dept: SCHEDULING | Facility: HOME HEALTH | Age: 66
End: 2021-07-16
Payer: MEDICARE

## 2021-07-16 PROCEDURE — 3331090001 HH PPS REVENUE CREDIT

## 2021-07-16 PROCEDURE — 3331090002 HH PPS REVENUE DEBIT

## 2021-07-16 PROCEDURE — G0157 HHC PT ASSISTANT EA 15: HCPCS

## 2021-07-17 VITALS
RESPIRATION RATE: 18 BRPM | DIASTOLIC BLOOD PRESSURE: 83 MMHG | RESPIRATION RATE: 17 BRPM | OXYGEN SATURATION: 97 % | TEMPERATURE: 98.1 F | HEART RATE: 65 BPM | OXYGEN SATURATION: 98 % | SYSTOLIC BLOOD PRESSURE: 142 MMHG | DIASTOLIC BLOOD PRESSURE: 72 MMHG | SYSTOLIC BLOOD PRESSURE: 121 MMHG | HEART RATE: 66 BPM | TEMPERATURE: 98.1 F

## 2021-07-17 PROCEDURE — 3331090002 HH PPS REVENUE DEBIT

## 2021-07-17 PROCEDURE — 3331090001 HH PPS REVENUE CREDIT

## 2021-07-18 VITALS
TEMPERATURE: 97.5 F | OXYGEN SATURATION: 99 % | HEART RATE: 67 BPM | RESPIRATION RATE: 18 BRPM | DIASTOLIC BLOOD PRESSURE: 70 MMHG | SYSTOLIC BLOOD PRESSURE: 123 MMHG

## 2021-07-18 PROCEDURE — 3331090001 HH PPS REVENUE CREDIT

## 2021-07-18 PROCEDURE — 3331090002 HH PPS REVENUE DEBIT

## 2021-07-19 ENCOUNTER — HOME CARE VISIT (OUTPATIENT)
Dept: SCHEDULING | Facility: HOME HEALTH | Age: 66
End: 2021-07-19
Payer: MEDICARE

## 2021-07-19 ENCOUNTER — HOME CARE VISIT (OUTPATIENT)
Dept: HOME HEALTH SERVICES | Facility: HOME HEALTH | Age: 66
End: 2021-07-19
Payer: MEDICARE

## 2021-07-19 PROCEDURE — 3331090002 HH PPS REVENUE DEBIT

## 2021-07-19 PROCEDURE — G0157 HHC PT ASSISTANT EA 15: HCPCS

## 2021-07-19 PROCEDURE — 3331090001 HH PPS REVENUE CREDIT

## 2021-07-20 PROCEDURE — 3331090001 HH PPS REVENUE CREDIT

## 2021-07-20 PROCEDURE — 3331090002 HH PPS REVENUE DEBIT

## 2021-07-21 ENCOUNTER — HOME CARE VISIT (OUTPATIENT)
Dept: SCHEDULING | Facility: HOME HEALTH | Age: 66
End: 2021-07-21
Payer: MEDICARE

## 2021-07-21 VITALS
HEART RATE: 80 BPM | TEMPERATURE: 98.2 F | RESPIRATION RATE: 18 BRPM | OXYGEN SATURATION: 97 % | DIASTOLIC BLOOD PRESSURE: 78 MMHG | SYSTOLIC BLOOD PRESSURE: 124 MMHG

## 2021-07-21 PROCEDURE — 3331090002 HH PPS REVENUE DEBIT

## 2021-07-21 PROCEDURE — 3331090001 HH PPS REVENUE CREDIT

## 2021-07-21 PROCEDURE — G0157 HHC PT ASSISTANT EA 15: HCPCS

## 2021-07-22 PROCEDURE — 3331090002 HH PPS REVENUE DEBIT

## 2021-07-22 PROCEDURE — 3331090001 HH PPS REVENUE CREDIT

## 2021-07-23 ENCOUNTER — HOME CARE VISIT (OUTPATIENT)
Dept: SCHEDULING | Facility: HOME HEALTH | Age: 66
End: 2021-07-23
Payer: MEDICARE

## 2021-07-23 PROCEDURE — 3331090002 HH PPS REVENUE DEBIT

## 2021-07-23 PROCEDURE — 3331090001 HH PPS REVENUE CREDIT

## 2021-07-23 PROCEDURE — G0151 HHCP-SERV OF PT,EA 15 MIN: HCPCS

## 2021-07-24 PROCEDURE — 3331090001 HH PPS REVENUE CREDIT

## 2021-07-24 PROCEDURE — 3331090002 HH PPS REVENUE DEBIT

## 2021-07-25 VITALS
OXYGEN SATURATION: 99 % | TEMPERATURE: 98.2 F | SYSTOLIC BLOOD PRESSURE: 130 MMHG | RESPIRATION RATE: 18 BRPM | DIASTOLIC BLOOD PRESSURE: 80 MMHG | HEART RATE: 88 BPM

## 2021-07-25 PROCEDURE — 3331090002 HH PPS REVENUE DEBIT

## 2021-07-25 PROCEDURE — 3331090001 HH PPS REVENUE CREDIT

## 2021-07-25 NOTE — HOME HEALTH
SUBJECTIVE: I went out to the dentist today, and it was a lot getting around. I felt so lousy afterwards and I just got home around 12am. I didn't take the cane with me either because I haven't really needed it, because it seems to become more of a hazard. \"  Caregiver involvement: Pt lives w/ , and he assists w/ meals and transportation needs. Medications reconciled and all medications ARE available in the home this visit. The following education was provided regarding medications, medication interactions, and look a like medications: Continue medications as prescribed. Anticoagulant med safety. Medications  are EFFECTIVE at this time. Home health supplies by type and quantity ordered/delivered this visit include: none   OBJECTIVES:  Patient education provided this visit: Instructed pt on correct sequencing and use of SPC AD to normalize gait mechanics and reduce fall risk. Pain medication safety. DRESSING CHANGED - see wound addendum   FUNCTIONAL ASSESSMENT:  Tinetti:  indicates low risk or falls. ( indicates high fall risk at IE)  TU.42 seconds w/o UE indicative of low fall risk.  (30+ seconds at IE using FWW indicative of high fall risk)  FTSTS test: 14 seconds w/o UE support. AROM: 2 - 105  PROM: 0 - 107.  (0 - 81 active - active assisted L knee ROM at IE)  TRANSFERS: Sit to stand from couch chair x2 w/ BUE support, supervision. Functional Sit to stand from average height seat x10 w/o BUE support and LLE slightly extended in transition. GAIT: Pt amb indoors on even/uneven surfaces w/o AD w/ noted antalgic gait w/ pronounced lateral swaying, uneven stride/step length. Highly recommended use of SPC AD to normalize gait mechanics and reduce fall risk, however pt verbalized \"I don't need it. \"   ASSESSMENT/Progress toward goals:   Pt demonstrates significant improvements on functional assessments.  Goals met for Tinetti, TUG, and FTSTS indicating progression from high risk for falls to low fall risk. ROM goals met which allows pt to safely transfer in/out of car, shower, and complete stair mobility. Pt has been free of falls despite therapist recommendation to use SPC AD to normalize mechanics. Pt verbalized she will be returning to work next week, and is agreeable to discharge plans at this time. Opportunity for questions, however none at this time. Home exercise program: ankle pumps, ankle circles, quad sets, heel slides, hamstring sets, straight leg raise, SAQ. Seated LAQs. Standing: heel/toe raises, hamstring curls, hip side kicks, hip back kicks, hip front kicks, marching. 2 - 3 x/day 10-15xea. Elevate L leg 20-30 mins with ice packs x 3-4x/day. Walk every hour during the day. PLAN: Discharge from [address] next visit. Continued need for the following skills: [facility] Physical Therapy   The following discharge planning was discussed with the pt/caregiver: HHPT frequency 1w1, 3w2 w/ planned DC on to self, family, and under MD supervision when goals met, or max benefits achieved.  - Pt/cg verbalized understanding

## 2021-07-26 VITALS
HEART RATE: 84 BPM | SYSTOLIC BLOOD PRESSURE: 122 MMHG | OXYGEN SATURATION: 99 % | TEMPERATURE: 97.5 F | RESPIRATION RATE: 16 BRPM | DIASTOLIC BLOOD PRESSURE: 78 MMHG

## 2021-08-02 ENCOUNTER — HOSPITAL ENCOUNTER (OUTPATIENT)
Dept: PHYSICAL THERAPY | Age: 66
Discharge: HOME | End: 2021-08-02
Payer: COMMERCIAL

## 2021-08-02 PROCEDURE — 97110 THERAPEUTIC EXERCISES: CPT

## 2021-08-02 PROCEDURE — 97162 PT EVAL MOD COMPLEX 30 MIN: CPT

## 2021-08-02 PROCEDURE — 97530 THERAPEUTIC ACTIVITIES: CPT

## 2021-08-02 NOTE — PROGRESS NOTES
PT DAILY TREATMENT NOTE/KNEE EVAL     Patient Name: Stephanie Middleton  Date:2021  : 1955  [x]  Patient  Verified  Payor: Karen Peaks / Plan: [place]ARE PART [place] / Product Type: Medicare /    In time: 9:49A  Out time: 10:28A  Total Treatment Time (min): 39  Visit #: 1 of 10    Medicare/BCBS Only   Total Timed Codes (min):  23 1:1 Treatment Time:  39     Treatment Area: Pain in left knee [M25.562]    SUBJECTIVE  Pain Level (0-10 scale): 3  []constant []intermittent []improving []worsening []no change since onset    Any medication changes, allergies to medications, adverse drug reactions, diagnosis change, or new procedure performed?: [x] No    [] Yes (see summary sheet for update)  Subjective functional status/changes:     PLOF: Ind/pain free performing ADL/IADLs, self care tasks, recreational participation and vocational responsibilities   Limitations to PLOF: Increased pain/stiffness  Mechanism of Injury: s/p left TKA performed on 21. MD f/u 21  Current symptoms/Complaints: Reports symptoms as achy and stiff at left knee in which limits her from finding a comfortable position to sleep in. Also, limiting in walking her dogs at this time. 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 ?? Eval                  []??????? ? ? Re-Eval         10 min Therapeutic Exercise:  [x]????????? See flow sheet :   Rationale: increase ROM and increase strength to improve the patients ability to perform ADLs with greater ease     13 min Therapeutic Activity:  []?????????  See flow sheet :   Rationale: pt awarness/education of patho and HEP  to improve the patients ability to return to PLOF                                                                    With   [x]? ???? TE   [x]? ???? TA   []????? neuro   []????? other: Patient Education: [x]????? Review HEP    []????? Progressed/Changed HEP based on:   []????? positioning   []????? body mechanics   []????? transfers   []????? heat/ice application    []????? other:         Observation/Inspection: Skin temperature WNL; min swelling; incision healing well w/ no signs of infection    Gait/Posture: Left Antalgic, Decreased left terminal knee extension/stance time; fwd flexed trunk      Functional Sit to Stand: Increased WB/push off through right LE, fwd flexed trunk     Balance/Stability:  SLS: Left LE: 7 sec, Right LE: 20 sec  Rhomberg: NT secondary to TC  Mod Tandem:  NT secondary to TC    SLR: min to no left quad extensor lag     Girth Measurements (just proximal to patella): Right Knee: 36 cm, Left Knee: 42 cm    AROM:   Left Knee: -3 - 113 deg   Right Knee: 0 - 135 deg    MMT (1-5):    Right Gross Hip: 4+/5  Right Knee Flex/Ext:  5/5  Right Gross Ankle:  5/5  Left Gross Hip: 4-/5  Left Knee Flex: 4/5  Left Knee Ext:  4-/5  Left Gross Ankle: 4+/5    Joint Mobility:    Patellar: Hypomobile inf<>sup glides    Tibiofemoral: Hypomobile w/ AP/PA glides    Sensation: Diminished light touch left anterior aspect of lower leg; intact remaining regions of B LE     Flexibility: [] Unable to assess at this time  Hamstrings:    (L) Tightness= [] WNL   [] Min   [x] Mod   [] Severe    (R) Tightness= [] WNL   [x] Min   [] Mod   [] Severe  Quadriceps:    (L) Tightness= [] WNL   [] Min   [x] Mod   [] Severe    (R) Tightness= [] WNL   [x] Min   [] Mod   [] Severe  Gastroc:    (L) Tightness= [] WNL   [] Min   [x] Mod   [] Severe    (R) Tightness= [] WNL   [x] Min   [] Mod   [] Severe                                  Palpation  [] Min  [x] Mod  [] Severe    Location: left proximal gastroc, distal hamstring/adductors, patellar tendon     Pain Level (0-10 scale) post treatment: 3    ASSESSMENT/Changes in Function: See POC     Patient will continue to benefit from skilled PT services to modify and progress therapeutic interventions, address functional mobility deficits, address ROM deficits, address strength deficits, analyze and address soft tissue restrictions, analyze and cue movement patterns, analyze and modify body mechanics/ergonomics and assess and modify postural abnormalities to attain remaining goals.      [x]  See Plan of Care  []  See progress note/recertification  []  See Discharge Summary         Progress towards goals / Updated goals:  See POC    PLAN  [x]  Upgrade activities as tolerated     [x]  Continue plan of care  []  Update interventions per flow sheet       []  Discharge due to:_  []  Other:_      Pam Shaffer DPT 8/2/2021  8:19 AM

## 2021-08-02 NOTE — PROGRESS NOTES
[facility] [address]  [phone] fax  Plan of Care/ Statement of Necessity for Physical Therapy Services    Patient name: Audelia Stone Start of Care: 2021   Referral source: Faby Flores MD : 1955    Medical Diagnosis: Pain in left knee [M25.562]  Payor: Oh Villalta / Plan: [place] PART A / Product Type: Medicare /  Onset Date: Post Op: 21     Treatment Diagnosis: Pain in Left Knee    Prior Hospitalization: see medical history Provider#: [id number]   Medications: Verified on Patient summary List    Comorbidities: Hearing Impaired, Osteopenia, Arthritis    Prior Level of Function: Ind/pain free performing ADL/IADLs, self care tasks and recreational participation      The Plan of Care and following information is based on the information from the initial evaluation. Assessment/ key information:   Patient presents to clinic s/p left TKA  performed on 21 . Clinical examination demonstrates soft tissue restrictions, increased swelling, tibiofemoral and patellar hypomobility, decreased function (evident by FOTO score), decreased strength, ROM, stability, balance, flexibility, and overall mobility limitations/compensatory movement patterns. These limitations affect the patient's ability to perform ADLs/IADLs, sleep comfortably, leisure activities and work related tasks efficiently and pain free. The patient would benefit from skilled physical therapy interventions to address the aforementioned impairments in order to return to her PLOF of completing all functional activities pain free and independently.        Evaluation Complexity History MEDIUM  Complexity : 1-2 comorbidities / personal factors will impact the outcome/ POC ; Examination MEDIUM Complexity : 3 Standardized tests and measures addressing body structure, function, activity limitation and / or participation in recreation  ;Presentation MEDIUM Complexity : Evolving with changing characteristics  ; Clinical Decision Making MEDIUM Complexity : FOTO score of 26-74  Overall Complexity Rating: MEDIUM  Problem List: pain affecting function, decrease ROM, decrease strength, edema affecting function, impaired gait/ balance, decrease ADL/ functional abilitiies, decrease activity tolerance, decrease flexibility/ joint mobility and decrease transfer abilities   Treatment Plan may include any combination of the following: Therapeutic exercise, Therapeutic activities, Neuromuscular re-education, Physical agent/modality, Gait/balance training, Manual therapy, Aquatic therapy, Patient education, Self Care training, Functional mobility training, Home safety training and Stair training  Patient / Family readiness to learn indicated by: asking questions, trying to perform skills and interest  Persons(s) to be included in education: patient (P)  Barriers to Learning/Limitations: None  Patient Goal (s): \"get knee movement back to normal\"  Patient Self Reported Health Status: good  Rehabilitation Potential: good     Short Term Goals: To be accomplished in 2 treatments:    1. Patient will become proficient in their HEP and will be compliant in performing that program.    Evaluation: HEP established      Long Term Goals: To be accomplished in 10 treatments:    1. Patient's pain level will be 0-1/10 with activity in order to improve patient's ability to perform normal ADLs. Evaluation: Pain level 0-3/10     2. Patient will demonstrate MMT of left Knee Flex/Ext 5/5 in order to return to work related tasks pain free. Evaluation: Left Knee Flex: 4/5,  Left Knee Ext: 4-/5    3. Patient will increase FOTO score to 84 points to indicate increased functional mobility. Evaluation: 66 points      4. Patient will demonstrate pain free left knee AROM >/= 0 - 120 degrees in order to perform ADLs with greater ease. Evaluation: Left Knee AROM: -3  113 deg     5.  Patient will demonstrate left LE SLS >/= 20 sec in order to demonstrate a decreased fall risk  Evaluation: left LE SLS: 7 sec    Frequency / Duration: Patient to be seen 2 times per week for 10 treatments. Patient/ Caregiver education and instruction: Diagnosis, prognosis, self care, activity modification and exercises   [x]  Plan of care has been reviewed with PTA    Certification Period: 08/02/21 - 09/01/21  Sandy Leong DPT 8/2/2021 8:14 AM  _____________________________________________________________________  I certify that the above Therapy Services are being furnished while the patient is under my care. I agree with the treatment plan and certify that this therapy is necessary.     [de-identified] Signature:____________Date:_________TIME:________     Michele Cheng MD  ** Signature, Date and Time must be completed for valid certification **    Please sign and return to [facility] [address]  [phone] fax

## 2021-08-10 ENCOUNTER — HOSPITAL ENCOUNTER (OUTPATIENT)
Dept: PHYSICAL THERAPY | Age: 66
Discharge: HOME | End: 2021-08-10
Payer: COMMERCIAL

## 2021-08-10 PROCEDURE — 97110 THERAPEUTIC EXERCISES: CPT

## 2021-08-10 PROCEDURE — 97112 NEUROMUSCULAR REEDUCATION: CPT

## 2021-08-10 PROCEDURE — 97140 MANUAL THERAPY 1/> REGIONS: CPT

## 2021-08-10 NOTE — PROGRESS NOTES
PT DAILY TREATMENT NOTE     Patient Name: Gustavo Tanner  Date:8/10/2021  : 1955  [x]  Patient  Verified  Payor: BLUE CROSS / Plan: Baljinderemily SANDOVAL [employer] [id number] PPO / Product Type: PPO /    In time:12:00  Out time:12:53  Total Treatment Time (min): 48  Visit #: 2 of 10    Medicare/BCBS Only   Total Timed Codes (min):  43 1:1 Treatment Time:  43       Treatment Area: Pain in left knee [M25.562]  S/P total knee replacement, left [Z96.652]    SUBJECTIVE  Pain Level (0-10 scale): 3  Any medication changes, allergies to medications, adverse drug reactions, diagnosis change, or new procedure performed?: [x] No    [] Yes (see summary sheet for update)  Subjective functional status/changes:   [] No changes reported  \"The problem is, I'm not sleeping\"    OBJECTIVE    Modality rationale: decrease pain to improve the patients ability to perform daily tasks   Min Type Additional Details    [] Estim:  []Unatt       []IFC  []Premod                        []Other:  []w/ice   []w/heat  Position:  Location:    [] Estim: []Att    []TENS instruct  []NMES                    []Other:  []w/US   []w/ice   []w/heat  Position:  Location:    []  Traction: [] Cervical       []Lumbar                       [] Prone          []Supine                       []Intermittent   []Continuous Lbs:  [] before manual  [] after manual    []  Ultrasound: []Continuous   [] Pulsed                           []1MHz   []3MHz W/cm2:  Location:    []  Iontophoresis with dexamethasone         Location: [] Take home patch   [] In clinic   10 [x]  Ice     []  heat  []  Ice massage  []  Laser   []  Anodyne Position: long sit  Location: left knee    []  Laser with stim  []  Other:  Position:  Location:    []  Vasopneumatic Device    []  Right     []  Left  Pre-treatment girth:  Post-treatment girth:  Measured at (location):  Pressure:       [] lo [] med [] hi   Temperature: [] lo [] med [] hi   [] Skin assessment post-treatment:  []intact []redness- no adverse reaction    []redness  adverse reaction:       25 min Therapeutic Exercise:  [x] See flow sheet :   Rationale: increase ROM and increase strength to improve the patients ability to perform ADLs    8 min Neuromuscular Re-education:  [x]  See flow sheet :   Rationale: increase strength, improve coordination, improve balance and increase proprioception  to improve the patients ability to perform functional tasks    10 min Manual Therapy:  Left knee tib/fem Jt mobs, left knee PROM, STM to left quad and pes ans, patella mobs, scar massage   The manual therapy interventions were performed at a separate and distinct time from the therapeutic activities interventions. Rationale: decrease pain, increase ROM and increase tissue extensibility to improve functional mobility            With   [] TE   [] TA   [] neuro   [] other: Patient Education: [x] Review HEP    [] Progressed/Changed HEP based on:   [] positioning   [] body mechanics   [] transfers   [] heat/ice application    [] other:      Other Objective/Functional Measures:   First f/u after Eval  ROM after manual 6-116*     Pain Level (0-10 scale) post treatment: 0    ASSESSMENT/Changes in Function: Initiated treatment program per flow sheet. Reviewed HEP for understanding and compliance. Pt states she has been walking a lot and has gone up/down stairs at work. Cont's to report soreness at night that interferes with sleep. TTP @ pes ans and tension at distal incision. Patient will continue to benefit from skilled PT services to modify and progress therapeutic interventions, address functional mobility deficits, address ROM deficits, address strength deficits, analyze and address soft tissue restrictions, analyze and cue movement patterns, analyze and modify body mechanics/ergonomics and address imbalance/dizziness to attain remaining goals.      []  See Plan of Care  []  See progress note/recertification  []  See Discharge Summary         Progress towards goals / Updated goals:  Short Term Goals: To be accomplished in 2 treatments:    1. Patient will become proficient in their HEP and will be compliant in performing that program.    Evaluation: HEP established       Long Term Goals: To be accomplished in 10 treatments:    1. Patient's pain level will be 0-1/10 with activity in order to improve patient's ability to perform normal ADLs. Evaluation: Pain level 0-3/10      2. Patient will demonstrate MMT of left Knee Flex/Ext 5/5 in order to return to work related tasks pain free. Evaluation: Left Knee Flex: 4/5,  Left Knee Ext: 4-/5     3. Patient will increase FOTO score to 84 points to indicate increased functional mobility. Evaluation: 66 points       4. Patient will demonstrate pain free left knee AROM >/= 0 - 120 degrees in order to perform ADLs with greater ease. Evaluation: Left Knee AROM: -3  113 deg   Current: Left knee 6-116* 8/10/2021     5.  Patient will demonstrate left LE SLS >/= 20 sec in order to demonstrate a decreased fall risk  Evaluation: left LE SLS: 7 sec       PLAN  []  Upgrade activities as tolerated     [x]  Continue plan of care  []  Update interventions per flow sheet       []  Discharge due to:_  []  Other:_      Pepe Mendoza, SIDNEY 8/10/2021  12:04 PM    Future Appointments   Date Time Provider Patricia Granado   8/13/2021 10:30 AM Singh [facility]   8/17/2021 10:30 AM [facility]   8/19/2021  9:45 AM Kate Conway, PT [facility]   8/24/2021 10:30 AM Morelia Arredondo DPT [facility]   8/26/2021  9:45 AM [facility]   8/31/2021 10:30 AM Kate Conway PT [facility]

## 2021-08-13 ENCOUNTER — HOSPITAL ENCOUNTER (OUTPATIENT)
Dept: PHYSICAL THERAPY | Age: 66
Discharge: HOME | End: 2021-08-13
Payer: COMMERCIAL

## 2021-08-13 PROCEDURE — 97110 THERAPEUTIC EXERCISES: CPT

## 2021-08-13 PROCEDURE — 97140 MANUAL THERAPY 1/> REGIONS: CPT

## 2021-08-13 PROCEDURE — 97112 NEUROMUSCULAR REEDUCATION: CPT

## 2021-08-13 NOTE — PROGRESS NOTES
PT DAILY TREATMENT NOTE     Patient Name: Teri Cadet  Date:2021  : 1955  [x]  Patient  Verified  Payor: BLUE CROSS / Plan: Roseline Jauregui [id number] PPO / Product Type: PPO /    In time:10:30  Out time:11:29  Total Treatment Time (min): 61  Visit #: 3 of 10    Medicare/BCBS Only   Total Timed Codes (min):  49 1:1 Treatment Time:  52       Treatment Area: Pain in left knee [M25.562]  S/P total knee replacement, left [Z96.652]    SUBJECTIVE  Pain Level (0-10 scale): 3  Any medication changes, allergies to medications, adverse drug reactions, diagnosis change, or new procedure performed?: [x] No    [] Yes (see summary sheet for update)  Subjective functional status/changes:   [] No changes reported  Pt reports not sleeping well because of her knee    OBJECTIVE    Modality rationale: decrease pain to improve the patients ability to perform daily tasks   Min Type Additional Details    [] Estim:  []Unatt       []IFC  []Premod                        []Other:  []w/ice   []w/heat  Position:  Location:    [] Estim: []Att    []TENS instruct  []NMES                    []Other:  []w/US   []w/ice   []w/heat  Position:  Location:    []  Traction: [] Cervical       []Lumbar                       [] Prone          []Supine                       []Intermittent   []Continuous Lbs:  [] before manual  [] after manual    []  Ultrasound: []Continuous   [] Pulsed                           []1MHz   []3MHz W/cm2:  Location:    []  Iontophoresis with dexamethasone         Location: [] Take home patch   [] In clinic   10 [x]  Ice     []  heat  []  Ice massage  []  Laser   []  Anodyne Position: reclined  Location: left knee    []  Laser with stim  []  Other:  Position:  Location:    []  Vasopneumatic Device    []  Right     []  Left  Pre-treatment girth:  Post-treatment girth:  Measured at (location):  Pressure:       [] lo [] med [] hi   Temperature: [] lo [] med [] hi   [] Skin assessment post-treatment:  []intact []redness- no adverse reaction    []redness  adverse reaction:     31 min Therapeutic Exercise:  [x] See flow sheet :   Rationale: increase ROM and increase strength to improve the patients ability to perform ADLs    8 min Neuromuscular Re-education:  [x]  See flow sheet :   Rationale: increase strength, improve coordination, improve balance and increase proprioception  to improve the patients ability to perform functional tasks    10 min Manual Therapy:  STM to medial HS, patella mobs, tib/fem jt mobs for knee flex and ext   The manual therapy interventions were performed at a separate and distinct time from the therapeutic activities interventions. Rationale: decrease pain, increase ROM and increase tissue extensibility to improve functional mobility        With   [] TE   [] TA   [] neuro   [] other: Patient Education: [x] Review HEP    [] Progressed/Changed HEP based on:   [] positioning   [] body mechanics   [] transfers   [] heat/ice application    [] other:      Other Objective/Functional Measures:    AROM 4-120* after manual therapy    Pain Level (0-10 scale) post treatment: 1    ASSESSMENT/Changes in Function: Demo's improved AROM following treatment today. Vc's to improve standing posture as pt tends to stand with forward lean. Challenged with SL stability. Cont's to reports difficulty sleeping through the night d/t pain. Patient will continue to benefit from skilled PT services to modify and progress therapeutic interventions, address functional mobility deficits, address ROM deficits, address strength deficits, analyze and address soft tissue restrictions, analyze and cue movement patterns, analyze and modify body mechanics/ergonomics, assess and modify postural abnormalities and address imbalance/dizziness to attain remaining goals. []  See Plan of Care  []  See progress note/recertification  []  See Discharge Summary         Progress towards goals / Updated goals:  Short Term Goals:  To be accomplished in 2 treatments:    1. Patient will become proficient in their HEP and will be compliant in performing that program.    Evaluation: HEP established    Current: Met, pt reports compliance 8/13/2021      Long Term Goals: To be accomplished in 10 treatments:    1. Patient's pain level will be 0-1/10 with activity in order to improve patient's ability to perform normal ADLs.    Evaluation: Pain level 0-3/10      2. Patient will demonstrate MMT of left Knee Flex/Ext 5/5 in order to return to work related tasks pain free.    Evaluation: Left Knee Flex: 4/5,  Left Knee Ext: 4-/5     3. Patient will increase FOTO score to 84 points to indicate increased functional mobility.    Evaluation: 66 points       4. Patient will demonstrate pain free left knee AROM >/= 0 - 120 degrees in order to perform ADLs with greater ease.    Evaluation: Left Knee AROM: -1  875 HTW   Current: Left knee 6-116* 8/10/2021     5.  Patient will demonstrate left LE SLS >/= 20 sec in order to demonstrate a decreased fall risk  Evaluation: left LE SLS: 7 sec    PLAN  []  Upgrade activities as tolerated     [x]  Continue plan of care  []  Update interventions per flow sheet       []  Discharge due to:_  []  Other:_      Nghia Estrin, PTA 8/13/2021  10:33 AM    Future Appointments   Date Time Provider Patricia Granado   8/17/2021 10:30 AM Lazaro Bolden [facility]   8/19/2021  9:45 AM Do Restrepo, PT [facility]   8/24/2021 10:30 AM ELLA Del RosarioT [facility]   8/26/2021  9:45 AM Roger Acosta [facility]   8/31/2021 10:30 AM Do Restrepo, PT [facility]

## 2021-08-17 ENCOUNTER — HOSPITAL ENCOUNTER (OUTPATIENT)
Dept: PHYSICAL THERAPY | Age: 66
Discharge: HOME | End: 2021-08-17
Payer: COMMERCIAL

## 2021-08-17 PROCEDURE — 97110 THERAPEUTIC EXERCISES: CPT

## 2021-08-17 PROCEDURE — 97140 MANUAL THERAPY 1/> REGIONS: CPT

## 2021-08-17 NOTE — PROGRESS NOTES
PT DAILY TREATMENT NOTE     Patient Name: Hanane Corey Old  Date:2021  : 1955  [x]  Patient  Verified  Payor: BLUE CROSS / Plan: [facility] PPO / Product Type: PPO /    In time:10:30  Out time:11:20  Total Treatment Time (min): 50  Visit #: 4 of 10    Medicare/BCBS Only   Total Timed Codes (min):  40 1:1 Treatment Time:  40       Treatment Area: Pain in left knee [M25.562]  S/P total knee replacement, left [Z96.652]    SUBJECTIVE  Pain Level (0-10 scale):  2  Any medication changes, allergies to medications, adverse drug reactions, diagnosis change, or new procedure performed?: [x] No    [] Yes (see summary sheet for update)  Subjective functional status/changes:   [] No changes reported  My back got flared up last time with the addition of the balance ex's    OBJECTIVE    Modality rationale: decrease inflammation and decrease pain to improve the patients ability to improve activity tolerance   Min Type Additional Details    [] Estim:  []Unatt       []IFC  []Premod                        []Other:  []w/ice   []w/heat  Position:  Location:    [] Estim: []Att    []TENS instruct  []NMES                    []Other:  []w/US   []w/ice   []w/heat  Position:  Location:    []  Traction: [] Cervical       []Lumbar                       [] Prone          []Supine                       []Intermittent   []Continuous Lbs:  [] before manual  [] after manual    []  Ultrasound: []Continuous   [] Pulsed                           []1MHz   []3MHz W/cm2:  Location:    []  Iontophoresis with dexamethasone         Location: [] Take home patch   [] In clinic   10 [x]  Ice     []  heat  []  Ice massage  []  Laser   []  Anodyne Position: supine  Location: left knee    []  Laser with stim  []  Other:  Position:  Location:    []  Vasopneumatic Device    []  Right     []  Left  Pre-treatment girth:  Post-treatment girth:  Measured at (location):  Pressure:       [] lo [] med [] hi   Temperature: [] lo [] med [] hi [x] Skin assessment post-treatment:  [x]intact []redness- no adverse reaction    []redness  adverse reaction:         28 min Therapeutic Exercise:  [] See flow sheet :   Rationale: increase ROM and increase strength to improve the patients ability to perform ADL's, walking/standing tolerance to RTW        12 min Manual Therapy:    Scar massage, stretching into flexion/extension with TF glides. STM to distal quad   The manual therapy interventions were performed at a separate and distinct time from the therapeutic activities interventions. Rationale: increase ROM and increase tissue extensibility to improve ease of walking, stairs, transfers          With   [] TE   [] TA   [] neuro   [] other: Patient Education: [x] Review HEP    [] Progressed/Changed HEP based on:   [] positioning   [] body mechanics   [] transfers   [] heat/ice application    [] other:      Other Objective/Functional Measures:   PD balance as it aggravated her back     Pain Level (0-10 scale) post treatment: 1    ASSESSMENT/Changes in Function: on arrival, pt voiced concern regarding flare of back pain after addition of balance ex's last session. She has scheduled an appointment with MD regarding back pain. Knee ROM is progressing to [facility]. Pt is motivated and is active with daily tasks, walking dogs, but at a significantly limited duration. Patient will continue to benefit from skilled PT services to modify and progress therapeutic interventions, address functional mobility deficits, address ROM deficits, address strength deficits, analyze and address soft tissue restrictions, analyze and cue movement patterns, analyze and modify body mechanics/ergonomics, assess and modify postural abnormalities, address imbalance/dizziness and instruct in home and community integration to attain remaining goals.      []  See Plan of Care  []  See progress note/recertification  []  See Discharge Summary         Progress towards goals / Updated goals: 1. Patient will become proficient in their HEP and will be compliant in performing that program.    Evaluation: HEP established    Current: Met, pt reports compliance 8/13/2021      Long Term Goals: To be accomplished in 10 treatments:    1. Patient's pain level will be 0-1/10 with activity in order to improve patient's ability to perform normal ADLs.    Evaluation: Pain level 0-3/10      2. Patient will demonstrate MMT of left Knee Flex/Ext 5/5 in order to return to work related tasks pain free.    Evaluation: Left Knee Flex: 4/5,  Left Knee Ext: 4-/5     3. Patient will increase FOTO score to 84 points to indicate increased functional mobility.    Evaluation: 66 points       4. Patient will demonstrate pain free left knee AROM >/= 0 - 120 degrees in order to perform ADLs with greater ease.    Evaluation: Left Knee AROM: -3  726 XMP   Current: Left knee 6-116* 8/10/2021     5.  Patient will demonstrate left LE SLS >/= 20 sec in order to demonstrate a decreased fall risk  Evaluation: left LE SLS: 7 sec    PLAN  [x]  Upgrade activities as tolerated     []  Continue plan of care  []  Update interventions per flow sheet       []  Discharge due to:_  []  Other:_      Valdemar Hoffmann, PTA 8/17/2021  10:47 AM    Future Appointments   Date Time Provider Patricia Granado   8/19/2021  9:45 AM Lizbeth Stafford PT [facility]   8/24/2021 10:30 AM Aj Gauthier DPT [facility]   8/26/2021  9:45 AM Carmine Simons [facility]   8/31/2021 10:30 AM Lizbeth Stafford PT [facility]

## 2021-08-19 ENCOUNTER — HOSPITAL ENCOUNTER (OUTPATIENT)
Dept: PHYSICAL THERAPY | Age: 66
Discharge: HOME | End: 2021-08-19
Payer: COMMERCIAL

## 2021-08-19 PROCEDURE — 97140 MANUAL THERAPY 1/> REGIONS: CPT

## 2021-08-19 PROCEDURE — 97110 THERAPEUTIC EXERCISES: CPT

## 2021-08-19 NOTE — PROGRESS NOTES
PT DAILY TREATMENT NOTE     Patient Name: Keyla Hadley Old  Date:2021  : 1955  [x]  Patient  Verified  Payor: BLUE CROSS / Plan: [facility] PPO / Product Type: PPO /    In time:09:50  Out time:10:42  Total Treatment Time (min): 46  Visit #: 5 of 10    Medicare/BCBS Only   Total Timed Codes (min):  41 1:1 Treatment Time:  41       Treatment Area: Pain in left knee [M25.562]  S/P total knee replacement, left [Z96.652]    SUBJECTIVE  Pain Level (0-10 scale): 2-3 at night  Any medication changes, allergies to medications, adverse drug reactions, diagnosis change, or new procedure performed?: [x] No    [] Yes (see summary sheet for update)  Subjective functional status/changes:   [] No changes reported  \"I went to my neurosurgeon, they put me on steroids and I'm getting a shot\" Patient reports left knee pain of 2-3 at night, achy/sore     OBJECTIVE    Modality rationale: decrease inflammation and decrease pain to improve the patients ability to improve activity tolerance   Min Type Additional Details    [] Estim:  []Unatt       []IFC  []Premod                        []Other:  []w/ice   []w/heat  Position:  Location:    [] Estim: []Att    []TENS instruct  []NMES                    []Other:  []w/US   []w/ice   []w/heat  Position:  Location:    []  Traction: [] Cervical       []Lumbar                       [] Prone          []Supine                       []Intermittent   []Continuous Lbs:  [] before manual  [] after manual    []  Ultrasound: []Continuous   [] Pulsed                           []1MHz   []3MHz W/cm2:  Location:    []  Iontophoresis with dexamethasone         Location: [] Take home patch   [] In clinic   10' +1' set up [x]  Ice     []  heat  []  Ice massage  []  Laser   []  Anodyne Position: supine  Location:left knee     []  Laser with stim  []  Other:  Position:  Location:    []  Vasopneumatic Device    []  Right     []  Left  Pre-treatment girth:  Post-treatment girth:  Measured at (location):  Pressure:       [] lo [] med [] hi   Temperature: [] lo [] med [] hi   [x] Skin assessment post-treatment:  [x]intact []redness- no adverse reaction    []redness  adverse reaction:     33 min Therapeutic Exercise:  [x] See flow sheet :   Rationale: increase ROM and increase strength to improve the patients ability to perform ADL's, walking/standing tolerance for household and community ambulation, safely and independently     8 min Manual Therapy:   STM to distal adductors/hamstrings, proximal gastroc, gentle PROM knee flexion/extension   The manual therapy interventions were performed at a separate and distinct time from the therapeutic activities interventions. Rationale: increase ROM and increase tissue extensibility to improve ease of walking, stairs, transfers          With   [x] TE   [] TA   [] neuro   [] other: Patient Education: [x] Review HEP    [] Progressed/Changed HEP based on:   [] positioning   [] body mechanics   [] transfers   [] heat/ice application    [] other:      Other Objective/Functional Measures:   AROM left knee: 4-121 degrees    Added: TKE with ball    Pain Level (0-10 scale) post treatment: 1    ASSESSMENT/Changes in Function: Pt reports discomfort on medial aspect of left knee at night, and with bridges/hip adduction during today's session. Added TKE with ball at wall today to assist with left knee extension ROM. Noted improvements in left knee AROM flexion as demonstrated by measurements (see below). Will continue to progress patient as tolerated with ROM and LE strength/stability for community ambulation, stair negotiation, safely and independently.      Patient will continue to benefit from skilled PT services to modify and progress therapeutic interventions, address functional mobility deficits, address ROM deficits, address strength deficits, analyze and address soft tissue restrictions, analyze and cue movement patterns, analyze and modify body mechanics/ergonomics, assess and modify postural abnormalities, address imbalance/dizziness and instruct in home and community integration to attain remaining goals. []  See Plan of Care  []  See progress note/recertification  []  See Discharge Summary         Progress towards goals / Updated goals:  1. Patient will become proficient in their HEP and will be compliant in performing that program.    Evaluation: HEP established    Current: Met, pt reports compliance 8/13/2021      Long Term Goals: To be accomplished in 10 treatments:    1. Patient's pain level will be 0-1/10 with activity in order to improve patient's ability to perform normal ADLs.    Evaluation: Pain level 0-3/10   Current:   2. Patient will demonstrate MMT of left Knee Flex/Ext 5/5 in order to return to work related tasks pain free.    Evaluation: Left Knee Flex: 4/5,  Left Knee Ext: 4-/5  Current:   3. Patient will increase FOTO score to 84 points to indicate increased functional mobility.    Evaluation: 66 points    Current:   4. Patient will demonstrate pain free left knee AROM >/= 0 - 120 degrees in order to perform ADLs with greater ease.    Evaluation: Left Knee AROM: -2  082 BDY   Current: Left knee 6-116* 8/10/2021; AROM left knee: 4-121 degrees, progressing (8/19/21)  5.  Patient will demonstrate left LE SLS >/= 20 sec in order to demonstrate a decreased fall risk  Evaluation: left LE SLS: 7 sec  Current:    PLAN  [x]  Upgrade activities as tolerated     [x]  Continue plan of care  []  Update interventions per flow sheet       []  Discharge due to:_  []  Other:_      Bonnie Licea, PT 8/19/2021  10:45 AM    Future Appointments   Date Time Provider Patricia Granado   8/24/2021 10:30 AM Yashira Kyle DPT [facility]   8/26/2021  9:45 AM Yisel Cooper [facility]   8/31/2021 10:30 AM Lesly Summers, PT [facility]

## 2021-08-24 ENCOUNTER — HOSPITAL ENCOUNTER (OUTPATIENT)
Dept: PHYSICAL THERAPY | Age: 66
Discharge: HOME | End: 2021-08-24
Payer: COMMERCIAL

## 2021-08-24 PROCEDURE — 97110 THERAPEUTIC EXERCISES: CPT

## 2021-08-24 PROCEDURE — 97140 MANUAL THERAPY 1/> REGIONS: CPT

## 2021-08-24 NOTE — PROGRESS NOTES
PT DAILY TREATMENT NOTE     Patient Name: Adam Russo Old  Date:2021  : 1955  [x]  Patient  Verified  Payor: [employer] / Plan: [facility] [id number] PPO / Product Type: PPO /    In time:10:33A  Out time:11:26A  Total Treatment Time (min): 48  Visit #: 6 of 10    Medicare/BCBS Only   Total Timed Codes (min):  43 1:1 Treatment Time:  43       Treatment Area: Pain in left knee [M25.562]  S/P total knee replacement, left [Z96.652]    SUBJECTIVE  Pain Level (0-10 scale): 2-3 at night  Any medication changes, allergies to medications, adverse drug reactions, diagnosis change, or new procedure performed?: [x] No    [] Yes (see summary sheet for update)  Subjective functional status/changes:   [] No changes reported  Patient reports overall improvements in left knee function, w/ biggest limitation being pain levels increasing at night. States low back is bothering her more than left knee at this time, w/ planned MD f/u for steriod injection of l/s on 21.     OBJECTIVE    Modality rationale: decrease inflammation and decrease pain to improve the patients ability to improve activity tolerance   Min Type Additional Details    [] Estim:  []Unatt       []IFC  []Premod                        []Other:  []w/ice   []w/heat  Position:  Location:    [] Estim: []Att    []TENS instruct  []NMES                    []Other:  []w/US   []w/ice   []w/heat  Position:  Location:    []  Traction: [] Cervical       []Lumbar                       [] Prone          []Supine                       []Intermittent   []Continuous Lbs:  [] before manual  [] after manual    []  Ultrasound: []Continuous   [] Pulsed                           []1MHz   []3MHz W/cm2:  Location:    []  Iontophoresis with dexamethasone         Location: [] Take home patch   [] In clinic   10 [x]  Ice     []  heat  []  Ice massage  []  Laser   []  Anodyne Position: supine  Location:left knee     []  Laser with stim  []  Other:  Position:  Location:    [] Vasopneumatic Device    []  Right     []  Left  Pre-treatment girth:  Post-treatment girth:  Measured at (location):  Pressure:       [] lo [] med [] hi   Temperature: [] lo [] med [] hi   [x] Skin assessment post-treatment:  [x]intact []redness- no adverse reaction    []redness - adverse reaction:     33 min Therapeutic Exercise:  [x] See flow sheet :   Rationale: increase ROM and increase strength to improve the patients ability to perform ADL's, walking/standing tolerance for household and community ambulation, safely and independently     10 min Manual Therapy:   STM to distal adductors/hamstrings, proximal gastroc, gentle PROM knee flexion/extension; Gr II-III AP/PA tibfem mobs in supine   The manual therapy interventions were performed at a separate and distinct time from the therapeutic activities interventions. Rationale: increase ROM and increase tissue extensibility to improve ease of walking, stairs, transfers          With   [x] TE   [] TA   [] neuro   [] other: Patient Education: [x] Review HEP    [] Progressed/Changed HEP based on:   [] positioning   [] body mechanics   [] transfers   [] heat/ice application    [] other:      Other Objective/Functional Measures:   Left Knee AROM: (pre manual): - 5 -119 deg; (post manual): - 3 - 125 deg    Left Knee Flex/Ext MMT: 4+/5 (minor discomfort reported w/ ext)   Notable increased left knee swelling w/ performance of session    Pain Level (0-10 scale) post treatment: 1    ASSESSMENT/Changes in Function:   Improvements toward left knee mobility and knee strength mobility evident by objective measures noted above. Limitations in performance/progress hindered by report of low back pain and left knee swelling. Will continue to progress patient as tolerated with ROM and LE strength/stability for community ambulation, stair negotiation, safely and independently.      Patient will continue to benefit from skilled PT services to modify and progress therapeutic interventions, address functional mobility deficits, address ROM deficits, address strength deficits, analyze and address soft tissue restrictions, analyze and cue movement patterns, analyze and modify body mechanics/ergonomics, assess and modify postural abnormalities, address imbalance/dizziness and instruct in home and community integration to attain remaining goals. []  See Plan of Care  []  See progress note/recertification  []  See Discharge Summary         Progress towards goals / Updated goals:  1. Patient will become proficient in their HEP and will be compliant in performing that program.    Evaluation: HEP established    Current: Met, pt reports compliance 8/13/2021      Long Term Goals: To be accomplished in 10 treatments:    1. Patient's pain level will be 0-1/10 with activity in order to improve patient's ability to perform normal ADLs.    Evaluation: Pain level 0-3/10   Current:    2. Patient will demonstrate MMT of left Knee Flex/Ext 5/5 in order to return to work related tasks pain free.    Evaluation: Left Knee Flex: 4/5,  Left Knee Ext: 4-/5  Current: Progressing: Left Knee Flex/Ext MMT: 4+/5 (minor discomfort reported w/ ext), 08/24/21  3. Patient will increase FOTO score to 84 points to indicate increased functional mobility.    Evaluation: 66 points    4. Patient will demonstrate pain free left knee AROM >/= 0 - 120 degrees in order to perform ADLs with greater ease.    Evaluation: Left Knee AROM: -3 - 113 deg   Current: Left Knee AROM: (pre manual): - 5 -119 deg; (post manual): - 3 - 125 deg, 08/24/21  5.  Patient will demonstrate left LE SLS >/= 20 sec in order to demonstrate a decreased fall risk  Evaluation: left LE SLS: 7 sec   Current:    PLAN  [x]  Upgrade activities as tolerated     [x]  Continue plan of care  []  Update interventions per flow sheet       []  Discharge due to:_  []  Other:_      Jef Smart DPT 8/24/2021  10:45 AM    Future Appointments   Date Time Provider Patricia Granado   8/26/2021  9:45 AM Yoana Casey [facility]   8/31/2021 10:30 AM Marilee Amin, JONE [facility]

## 2021-08-26 ENCOUNTER — APPOINTMENT (OUTPATIENT)
Dept: PHYSICAL THERAPY | Age: 66
End: 2021-08-26
Payer: COMMERCIAL

## 2021-08-31 ENCOUNTER — HOSPITAL ENCOUNTER (OUTPATIENT)
Dept: PHYSICAL THERAPY | Age: 66
Discharge: HOME | End: 2021-08-31
Payer: COMMERCIAL

## 2021-08-31 PROCEDURE — 97530 THERAPEUTIC ACTIVITIES: CPT

## 2021-08-31 PROCEDURE — 97110 THERAPEUTIC EXERCISES: CPT

## 2021-08-31 NOTE — PROGRESS NOTES
PT DAILY TREATMENT NOTE     Patient Name: Hubert Suazo  Date:2021  : 1955  [x]  Patient  Verified  Payor: BLUE CROSS / Plan: [employer] [id number] PPO / Product Type: PPO /    In time:10:30A  Out time:11:26A  Total Treatment Time (min): 64  Visit #: 7 of 10    Medicare/BCBS Only   Total Timed Codes (min):  45 1:1 Treatment Time: 38       Treatment Area: Pain in left knee [M25.562]  S/P total knee replacement, left [Z96.652]    SUBJECTIVE  Pain Level (0-10 scale): 3  Any medication changes, allergies to medications, adverse drug reactions, diagnosis change, or new procedure performed?: [x] No    [] Yes (see summary sheet for update)  Subjective functional status/changes:   [] No changes reported  Patient reports soreness in left knee today; as per patient, she received a shot in the back yesterday afternoon \"it's a little sore\" \"SI joint is all inflamed because of the knee\"    OBJECTIVE    Modality rationale: decrease inflammation and decrease pain to improve the patients ability to improve activity tolerance   Min Type Additional Details    [] Estim:  []Unatt       []IFC  []Premod                        []Other:  []w/ice   []w/heat  Position:  Location:    [] Estim: []Att    []TENS instruct  []NMES                    []Other:  []w/US   []w/ice   []w/heat  Position:  Location:    []  Traction: [] Cervical       []Lumbar                       [] Prone          []Supine                       []Intermittent   []Continuous Lbs:  [] before manual  [] after manual    []  Ultrasound: []Continuous   [] Pulsed                           []1MHz   []3MHz W/cm2:  Location:    []  Iontophoresis with dexamethasone         Location: [] Take home patch   [] In clinic   10'+1' set up [x]  Ice     []  heat  []  Ice massage  []  Laser   []  Anodyne Position: supine  Location:left knee     []  Laser with stim  []  Other:  Position:  Location:    []  Vasopneumatic Device    []  Right     []  Left  Pre-treatment girth:  Post-treatment girth:  Measured at (location):  Pressure:       [] lo [] med [] hi   Temperature: [] lo [] med [] hi   [x] Skin assessment post-treatment:  [x]intact []redness- no adverse reaction    []redness - adverse reaction:     27/20 (1:1) min Therapeutic Exercise:  [x] See flow sheet :   Rationale: increase ROM and increase strength to improve the patients ability to perform ADL's, walking/standing tolerance for household and community ambulation, safely and independently     15 min Therapeutic Activity:  [x]  See flow sheet : FOTO, goals, PN assess    Rationale: increase ROM, increase strength and improve coordination  to improve the patients ability to perform transfers, ambulation and functional activities with greater ease      3 min Manual Therapy:   Gentle stretching in extension, patellar joint mobs all directions    The manual therapy interventions were performed at a separate and distinct time from the therapeutic activities interventions.   Rationale: increase ROM and increase tissue extensibility to improve ease of walking, stairs, transfers          With   [x] TE   [x] TA   [] neuro   [] other: Patient Education: [x] Review HEP    [] Progressed/Changed HEP based on:   [] positioning   [] body mechanics   [] transfers   [] heat/ice application    [x] other: pt's progress, HEP for TKE     Other Objective/Functional Measures:   Pain at worst: 2  Subjective % improvement: 80-90%  Functional improvements: patient reports she is walking the dogs  Functional limitations: large flights of stairs   Left knee AROM: lacking 5 degrees of extension -124 flexion; 0 degrees of extension after TKE  FOTO: 73 GROC: +7  Left Knee Flex: 5/5,  Left Knee Ext: 4+/5  Continues with noticeable limp, as per patient secondary to low back pain    Pain Level (0-10 scale) post treatment: 0    ASSESSMENT/Changes in Function:    See PN  Patient will continue to benefit from skilled PT services to modify and progress therapeutic interventions, address functional mobility deficits, address ROM deficits, address strength deficits, analyze and address soft tissue restrictions, analyze and cue movement patterns, analyze and modify body mechanics/ergonomics, assess and modify postural abnormalities, address imbalance/dizziness and instruct in home and community integration to attain remaining goals. []  See Plan of Care  []  See progress note/recertification  []  See Discharge Summary         Progress towards goals / Updated goals:  1. Patient will become proficient in their HEP and will be compliant in performing that program.    Evaluation: HEP established    Current: Met, pt reports compliance 8/13/2021      Long Term Goals: To be accomplished in 10 treatments:    1. Patient's pain level will be 0-1/10 with activity in order to improve patient's ability to perform normal ADLs.    Evaluation: Pain level 0-3/10   Current: Pain at worst: 2, progressing (8/31/21)    2. Patient will demonstrate MMT of left Knee Flex/Ext 5/5 in order to return to work related tasks pain free.    Evaluation: Left Knee Flex: 4/5,  Left Knee Ext: 4-/5  Current: Progressing: Left Knee Flex/Ext MMT: 4+/5 (minor discomfort reported w/ ext), 08/24/21; Left Knee Flex: 5/5,  Left Knee Ext: 4+/5, progressing (8/31/21)  3. Patient will increase FOTO score to 84 points to indicate increased functional mobility.    Evaluation: 66 points    Current: 73, progressing (8/31/21)  4. Patient will demonstrate pain free left knee AROM >/= 0 - 120 degrees in order to perform ADLs with greater ease.    Evaluation: Left Knee AROM: -3 - 113 deg   Current: Left Knee AROM: (pre manual): - 5 -119 deg; (post manual): - 3 - 125 deg, 08/24/21, Left knee AROM: lacking 5 degrees of extension -124 flexion; 0 degrees of extension after TKE, progressing (8/31/21)  5.  Patient will demonstrate left LE SLS >/= 20 sec in order to demonstrate a decreased fall risk  Evaluation: left LE SLS: 7 sec   Current: 15 seconds, progressing (8/31/21)    PLAN  [x]  Upgrade activities as tolerated     [x]  Continue plan of care  []  Update interventions per flow sheet       []  Discharge due to:_  [x]  Other: See PN      Maryellen Galloway, PT 8/31/2021  11:54 AM    Future Appointments   Date Time Provider Patricia Granado   9/2/2021  9:45 AM Daughtry, Jamse Koyanagi, [facility]   9/14/2021 10:30 AM Anshul Reyes [facility]   9/16/2021 10:30 AM Anshul Reyes [facility]   9/21/2021 10:30 AM Franklin Ashley [facility]   9/23/2021 11:15 AM Franklin Ashley [facility]   9/28/2021 10:30 AM Christa Tao [facility]   9/30/2021 10:30 AM Naeem, [address]

## 2021-08-31 NOTE — PROGRESS NOTES
[facility], [address] [place] [address]  [phone] fax    Progress Note  Patient name: Teto Parra Start of Care: 21   Referral source: Lizette Coker MD : 1955   Medical/Treatment Diagnosis: Pain in left knee [M25.562]  S/P total knee replacement, left [Z96.652]  Payor: [employer] / Plan: [facility] PPO / Product Type: PPO /  Onset Date:Post Op: 21                 Prior Hospitalization: see medical history Provider#: [id number]   Medications: Verified on Patient Summary List    Comorbidities: Hearing Impaired, Osteopenia, Arthritis    Prior Level of Function: Ind/pain free performing ADL/IADLs, self care tasks and recreational participation  Reporting period 21 to 21    Visits from Start of Care: 7    Missed Visits: 0    Key Functional Changes:   Pt is a 78y.o. year old female who has been receiving skilled OP PT services at [facility] with Pain in left knee [M25.562]  S/P total knee replacement, left [Z96.652]. Physical therapy has consisted of therapeutic exercises for increased strength, improved ROM; neuromuscular re-education for improved motor control and balance; therapeutic activities for independence with HEP, improved ability with transfers; modalities to decrease pain, decrease inflammation; manual therapy for decreased pain, increased tissue extensibility. Pt has been seen for initial evaluation and 6 visits, making good progress toward set goals (see below).    Functional and Objective measures taken:   Pain at worst: 2  Subjective % improvement: 80-90%  Functional improvements: patient reports she is walking the dogs  Functional limitations: large flights of stairs   Left knee AROM: lacking 5 degrees of extension -124 flexion; 0 degrees of extension after TKE  FOTO: 73 GROC: +7  Left Knee Flex: 5/5,  Left Knee Ext: 4+/5  Continues with noticeable limp, as per patient secondary to low back pain  Pt will continue to benefit from skilled OP PT 1-2 per week for 4 weeks in order to address remaining and above mentioned impairments to return to PLOF and maximize patient's functional status and independence. Progress toward goals:  1. Patient's pain level will be 0-1/10 with activity in order to improve patient's ability to perform normal ADLs.    Evaluation: Pain level 0-3/10   Current: Pain at worst: 2, progressing (8/31/21)    2. Patient will demonstrate MMT of left Knee Flex/Ext 5/5 in order to return to work related tasks pain free.    Evaluation: Left Knee Flex: 4/5,  Left Knee Ext: 4-/5  Current: Progressing: Left Knee Flex/Ext MMT: 4+/5 (minor discomfort reported w/ ext), 08/24/21; Left Knee Flex: 5/5,  Left Knee Ext: 4+/5, progressing (8/31/21)  3. Patient will increase FOTO score to 84 points to indicate increased functional mobility.    Evaluation: 66 points    Current: 73, progressing (8/31/21)  4. Patient will demonstrate pain free left knee AROM >/= 0 - 120 degrees in order to perform ADLs with greater ease.    Evaluation: Left Knee AROM: -3  113 deg   Current: Left Knee AROM: (pre manual): - 5 -119 deg; (post manual): - 3 - 125 deg, 08/24/21, Left knee AROM: lacking 5 degrees of extension -124 flexion; 0 degrees of extension after TKE, progressing (8/31/21)  5. Patient will demonstrate left LE SLS >/= 20 sec in order to demonstrate a decreased fall risk  Evaluation: left LE SLS: 7 sec   Current: 15 seconds, progressing (8/31/21)    Updated Goals: to be achieved in 4 weeks:   Continue with unmet goals  1. Patient's pain level will be 0-1/10 with activity in order to improve patient's ability to perform normal ADLs.    PN: Pain at worst: 2  2. Patient will demonstrate MMT of left Knee Flex/Ext 5/5 in order to return to work related tasks pain free.    PN: Left Knee Flex: 5/5,  Left Knee Ext: 4+/5  3.  Patient will increase FOTO score to 84 points to indicate increased functional mobility.    PN: 73  4. Patient will demonstrate pain free left knee AROM >/= 0 - 120 degrees in order to perform ADLs with greater ease.    PN: Left knee AROM: lacking 5 degrees of extension -124 flexion; 0 degrees of extension after TKE  5. Patient will demonstrate left LE SLS >/= 20 sec in order to demonstrate a decreased fall risk  PN: left LE SLS: 15 seconds      ASSESSMENT/RECOMMENDATIONS:  [x]Continue therapy per initial plan/protocol at a frequency of  1-2 x per week for 4 weeks  []Continue therapy with the following recommended changes:_____________________      _____________________________________________________________________  []Discontinue therapy progressing towards or have reached established goals  []Discontinue therapy due to lack of appreciable progress towards goals  []Discontinue therapy due to lack of attendance or compliance  []Await Physician's recommendations/decisions regarding therapy  []Other:________________________________________________________________    Thank you for this referral.   Desirae Toledo, PT 8/31/2021 11:21 AM  NOTE TO PHYSICIAN:  [address]  TO [facility]: [phone]  If you are unable to process this request in 24 hours please contact our office: [phone]  []  I have read the above report and request that my patient continue as recommended. []  I have read the above report and request that my patient continue therapy with the following changes/special instructions:________________________________________  []I have read the above report and request that my patient be discharged from therapy.     Physician's Signature:____________Date:_________TIME:________  Michele Cheng MD     ** Signature, Date and Time must be completed for valid certification **

## 2021-09-02 ENCOUNTER — HOSPITAL ENCOUNTER (OUTPATIENT)
Dept: PHYSICAL THERAPY | Age: 66
Discharge: HOME | End: 2021-09-02
Payer: COMMERCIAL

## 2021-09-02 PROCEDURE — 97110 THERAPEUTIC EXERCISES: CPT

## 2021-09-02 NOTE — PROGRESS NOTES
PT DAILY TREATMENT NOTE     Patient Name: Luan Sneed  Date:2021  : 1955  [x]  Patient  Verified  Payor: BLUE CROSS / Plan: [employer]eline Jauregui [id number] PPO / Product Type: PPO /    In time:9:45  Out time:10:42  Total Treatment Time (min): 62  Visit #: 1 of 8    Medicare/BCBS Only   Total Timed Codes (min):  47 1:1 Treatment Time:  52       Treatment Area: Pain in left knee [M25.562]  S/P total knee replacement, left [Z96.652]    SUBJECTIVE  Pain Level (0-10 scale): 1/10  Any medication changes, allergies to medications, adverse drug reactions, diagnosis change, or new procedure performed?: [x] No    [] Yes (see summary sheet for update)  Subjective functional status/changes:   [] No changes reported  \"The knee feels pretty good. It's the back and hips that hurt. I just got injections on Monday and its still sore. \"    OBJECTIVE    Modality rationale: decrease inflammation and decrease pain to improve the patients ability to reduce post therapy soreness   Min Type Additional Details    [] Estim:  []Unatt       []IFC  []Premod                        []Other:  []w/ice   []w/heat  Position:  Location:    [] Estim: []Att    []TENS instruct  []NMES                    []Other:  []w/US   []w/ice   []w/heat  Position:  Location:    []  Traction: [] Cervical       []Lumbar                       [] Prone          []Supine                       []Intermittent   []Continuous Lbs:  [] before manual  [] after manual    []  Ultrasound: []Continuous   [] Pulsed                           []1MHz   []3MHz W/cm2:  Location:    []  Iontophoresis with dexamethasone         Location: [] Take home patch   [] In clinic   10 [x]  Ice     []  heat  []  Ice massage  []  Laser   []  Anodyne Position:  Location:    []  Laser with stim  []  Other:  Position:  Location:    []  Vasopneumatic Device    []  Right     []  Left  Pre-treatment girth:  Post-treatment girth:  Measured at (location):  Pressure:       [] lo [] med [] hi Temperature: [] lo [] med [] hi   [] Skin assessment post-treatment:  []intact []redness- no adverse reaction    []redness - adverse reaction:     47 min Therapeutic Exercise:  [] See flow sheet :   Rationale: increase ROM, increase strength and improve coordination to improve the patients ability to increase ease of transfers, improve left knee stability, increase standing/amb tolerance           With   [] TE   [] TA   [] neuro   [] other: Patient Education: [x] Review HEP    [] Progressed/Changed HEP based on:   [] positioning   [] body mechanics   [] transfers   [] heat/ice application    [] other:      Other Objective/Functional Measures: Performed exercises per flow sheet. Pt given verbal cueing for proper technique of all exercises. Held bridges, prone hip ext, prone quad stretch due to LBP. Pain Level (0-10 scale) post treatment: 0/10    ASSESSMENT/Changes in Function: Resumed standing WB exercises today to facilitate improvement of left knee stability. Had Pt alternate between right and left LEs for repetitions to avoid increased pain in lower back as Pt noted difficulty with prolonged standing. Pt able to perform all standing interventions without increased pain in low back. Patient will continue to benefit from skilled PT services to address functional mobility deficits, address ROM deficits, address strength deficits, analyze and address soft tissue restrictions, analyze and cue movement patterns, analyze and modify body mechanics/ergonomics, assess and modify postural abnormalities, address imbalance/dizziness and instruct in home and community integration to attain remaining goals. []  See Plan of Care  []  See progress note/recertification  []  See Discharge Summary         Progress towards goals / Updated goals:  1. Patient's pain level will be 0-1/10 with activity in order to improve patient's ability to perform normal ADLs.    PN: Pain at worst: 2  2.  Patient will demonstrate MMT of left Knee Flex/Ext 5/5 in order to return to work related tasks pain free.    PN: Left Knee Flex: 5/5,  Left Knee Ext: 4+/5  3. Patient will increase FOTO score to 84 points to indicate increased functional mobility.    PN: 73  4. Patient will demonstrate pain free left knee AROM >/= 0 - 120 degrees in order to perform ADLs with greater ease.    PN: Left knee AROM: lacking 5 degrees of extension -124 flexion; 0 degrees of extension after TKE  5.  Patient will demonstrate left LE SLS >/= 20 sec in order to demonstrate a decreased fall risk  PN: left LE SLS: 15 seconds    PLAN  [x]  Upgrade activities as tolerated     [x]  Continue plan of care  []  Update interventions per flow sheet       []  Discharge due to:_  []  Other:_      Nonda Dang Causey, PT 9/2/2021  9:52 AM    Future Appointments   Date Time Provider Patricia Granado   9/14/2021 10:30 AM Fred Sylvester [facility]   9/16/2021 10:30 AM Fred Sylvester [facility]   9/21/2021 10:30 AM Margaret Escalante, [facility]   9/23/2021 11:15 AM Margaret Escalante [facility]   9/28/2021 10:30 AM Ventura Blancas [facility]   9/30/2021 10:30 AM Naeem, [address]

## 2021-09-10 ENCOUNTER — APPOINTMENT (OUTPATIENT)
Dept: PHYSICAL THERAPY | Age: 66
End: 2021-09-10
Payer: COMMERCIAL

## 2021-09-14 ENCOUNTER — HOSPITAL ENCOUNTER (OUTPATIENT)
Dept: PHYSICAL THERAPY | Age: 66
Discharge: HOME | End: 2021-09-14
Payer: COMMERCIAL

## 2021-09-14 PROCEDURE — 97530 THERAPEUTIC ACTIVITIES: CPT

## 2021-09-14 PROCEDURE — 97110 THERAPEUTIC EXERCISES: CPT

## 2021-09-14 NOTE — PROGRESS NOTES
PT DAILY TREATMENT NOTE     Patient Name: Jorge A Villar Old  Date:2021  : 1955  [x]  Patient  Verified  Payor: [employer] / Plan: [employer]eline Jauregui [id number] PPO / Product Type: PPO /    In time:10:35A  Out time:11:00A  Total Treatment Time (min): 25  Visit #: 2 of 8     Medicare/BCBS Only   Total Timed Codes (min):  25 1:1 Treatment Time:  25       Treatment Area: Pain in left knee [M25.562]  S/P total knee replacement, left [Z96.652]    SUBJECTIVE  Pain Level (0-10 scale): 0/10  Any medication changes, allergies to medications, adverse drug reactions, diagnosis change, or new procedure performed?: [x] No    [] Yes (see summary sheet for update)  Subjective functional status/changes:   [] No changes reported  Patient reports a 100% improvement in left knee since beginning skilled PT services. . States low back bothering her more at this time and would like to be D/C to updated HEP, as she may return to PT to be treated for low back symptoms. OBJECTIVE    8 min Therapeutic Exercise:  [x] See flow sheet :   Rationale: increase ROM, increase strength and improve coordination to improve the patients ability to increase ease of transfers, improve left knee stability, increase standing/amb tolerance    17 min Therapeutic Activity:  []  See flow sheet : FOTO/Goal Reassessment/ Pt Education   Rationale: increase patient awareness/education  to improve the patients ability to maintain CLOF             With   [x] TE   [] TA   [] neuro   [] other: Patient Education: [x] Review HEP    [] Progressed/Changed HEP based on:   [] positioning   [] body mechanics   [] transfers   [] heat/ice application    [] other:      Other Objective/Functional Measures:  See goals below     Pain Level (0-10 scale) post treatment: 0/10    ASSESSMENT/Changes in Function:    Patient reports a 100% improvement in left knee since beginning skilled PT services.  States low back bothering her more at this time and would like to be D/C to updated HEP, as she may return to PT to be treated for low back symptoms. Objectivley, pt demonstrates signficant improvements in AROM, functional status and strength since initial evaluation. Little to no improvements to note during this reporting period, however limitations to this could be contributed to flare up in symptoms at low back. Pt to be discharged to updated HEP at this time in order to maintain CLOF. []  See Plan of Care  []  See progress note/recertification  [x]  See Discharge Summary         Progress towards goals / Updated goals:  1. Patient's pain level will be 0-1/10 with activity in order to improve patient's ability to perform normal ADLs.    PN: Pain at worst: 2  Current:  Goal Met: Pain at worst of 0-1/10, 09/14/21    2. Patient will demonstrate MMT of left Knee Flex/Ext 5/5 in order to return to work related tasks pain free.    PN: Left Knee Flex: 5/5,  Left Knee Ext: 4+/5  Current: Remains: Left Knee Flex: 5/5,  Left Knee Ext: 4+/5, 09/14/21    3. Patient will increase FOTO score to 84 points to indicate increased functional mobility.    PN: 73  Current: Regressed: 72, 09/14/21    4. Patient will demonstrate pain free left knee AROM >/= 0 - 120 degrees in order to perform ADLs with greater ease.    PN: Left knee AROM: lacking 5 degrees of extension -124 flexion; 0 degrees of extension after TKE  Current: Remains: - 5 - 120, 09/14/21    5.  Patient will demonstrate left LE SLS >/= 20 sec in order to demonstrate a decreased fall risk  PN: left LE SLS: 15 seconds   Current: Remains:  left LE SLS: 15 seconds, 09/14/21      PLAN  []  Upgrade activities as tolerated     []  Continue plan of care  []  Update interventions per flow sheet       [x]  Discharge due to: Reports 100% improvement w/ no functional limitations pertaining to left knee  []  Other:_      Veronica Manzanares DPT 9/14/2021  9:52 AM    Future Appointments   Date Time Provider Patricia Granado   9/16/2021 10:30 AM Ra Mary Gaitan, DPT [facility] [address]   9/21/2021 10:30 AM Lizeth Aldrich, PT [facility] [address]   9/23/2021 11:15 AM Lizeth Aldrich, PT [facility] [address]   9/28/2021 10:30 [facility] [address]   9/30/2021 10:30 AM Naeem, [address]

## 2021-09-14 NOTE — PROGRESS NOTES
[facility] - [place] [address] [address]  [phone] fax    Discharge Summary    Patient name: Tha Jimenez Start of Care: 21   Referral source: Schuyler Herrera MD : 1955   Medical/Treatment Diagnosis: Pain in left knee [M25.562]  S/P total knee replacement, left [Z96.652]  Payor: [facility] / Plan: [facility] PPO / Product Type: PPO /  Onset Date:Post Op: 21         Prior Hospitalization: see medical history Provider#: [id number]   Medications: Verified on Patient Summary List    Comorbidities: Hearing Impaired, Osteopenia, Arthritis   Prior Level of Function:Ind/pain free performing ADL/IADLs, self care tasks and recreational participation    Visits from Start of Care: 9    Missed Visits: 0    Reporting Period : 21 to 21    Progress towards goals:  1. Patient's pain level will be 0-1/10 with activity in order to improve patient's ability to perform normal ADLs.    PN: Pain at worst: 2  Current:  Goal Met: Pain at worst of 0-1/10, 21     2. Patient will demonstrate MMT of left Knee Flex/Ext 5/5 in order to return to work related tasks pain free.    PN: Left Knee Flex: 5/5,  Left Knee Ext: 4+/5  Current: Remains: Left Knee Flex: 5/5,  Left Knee Ext: 4+/5, 21     3. Patient will increase FOTO score to 84 points to indicate increased functional mobility.    PN: 73  Current: Regressed: 72, 21     4. Patient will demonstrate pain free left knee AROM >/= 0 - 120 degrees in order to perform ADLs with greater ease.    PN: Left knee AROM: lacking 5 degrees of extension -124 flexion; 0 degrees of extension after TKE  Current: Remains: - 5 - 120, 21     5.  Patient will demonstrate left LE SLS >/= 20 sec in order to demonstrate a decreased fall risk  PN: left LE SLS: 15 seconds   Current: Remains:  left LE SLS: 15 seconds, 21      Assessment/ Summary of Care:  Patient reports a 100% improvement in left knee ss/sx since beginning skilled PT services. States low back bothering her more at this time and would like to be D/C to updated HEP, as she may return to PT to be treated for low back symptoms. Objectivley, pt demonstrates signficant improvements in AROM, functional status and strength since initial evaluation. Little to no improvements to note during this reporting period, however limitations to this could be contributed to flare up in symptoms at low back. Pt to be discharged to updated HEP at this time in order to maintain CLOF.      RECOMMENDATIONS:  [x]Discontinue therapy: [x]Patient has reached or is progressing toward set goals          Yelena Hobbs DPT 9/14/2021 11:03 AM

## 2021-09-16 ENCOUNTER — APPOINTMENT (OUTPATIENT)
Dept: PHYSICAL THERAPY | Age: 66
End: 2021-09-16
Payer: COMMERCIAL

## 2021-09-21 ENCOUNTER — APPOINTMENT (OUTPATIENT)
Dept: PHYSICAL THERAPY | Age: 66
End: 2021-09-21
Payer: COMMERCIAL

## 2021-09-23 ENCOUNTER — APPOINTMENT (OUTPATIENT)
Dept: PHYSICAL THERAPY | Age: 66
End: 2021-09-23
Payer: COMMERCIAL

## 2021-09-28 ENCOUNTER — APPOINTMENT (OUTPATIENT)
Dept: PHYSICAL THERAPY | Age: 66
End: 2021-09-28
Payer: COMMERCIAL

## 2021-09-30 ENCOUNTER — APPOINTMENT (OUTPATIENT)
Dept: PHYSICAL THERAPY | Age: 66
End: 2021-09-30
Payer: COMMERCIAL

## (undated) DEVICE — PIN GUIDE FIX 3.2X62 MM SCREW [GS9030620324P] [KOMET MEDICAL]

## (undated) DEVICE — 4.5 MM FULL RADIUS STRAIGHT                                    BLADES, POWER/EP-1, YELLOW, PACKAGED                                    6 PER BOX, STERILE: Brand: DYONICS

## (undated) DEVICE — DRAPE,U/ SHT,SPLIT,PLAS,STERIL: Brand: MEDLINE

## (undated) DEVICE — SOL INJ L R 1000ML BG --

## (undated) DEVICE — Device

## (undated) DEVICE — OCCLUSIVE GAUZE STRIP,3% BISMUTH TRIBROMOPHENATE IN PETROLATUM BLEND: Brand: XEROFORM

## (undated) DEVICE — PIN GUIDE FIX 3.2X62 MM SCREW [GS903A0620322P] [KOMET MEDICAL]

## (undated) DEVICE — ZIP 16 SURGICAL SKIN CLOSURE DEVICE: Brand: ZIP 16 SURGICAL SKIN CLOSURE DEVICE

## (undated) DEVICE — SOL IRRIGATION INJ NACL 0.9% 500ML BTL

## (undated) DEVICE — 3 BONE CEMENT MIXER: Brand: MIXEVAC

## (undated) DEVICE — SOLUTION IRRIG 3000ML LAC R FLX CONT

## (undated) DEVICE — U-DRAPE: Brand: CONVERTORS

## (undated) DEVICE — STERILE POLYISOPRENE POWDER-FREE SURGICAL GLOVES: Brand: PROTEXIS

## (undated) DEVICE — BLADE SAW W13XL90MM 1.19MM PARA

## (undated) DEVICE — SUT VCRL + 2-0 36IN CT1 UD --

## (undated) DEVICE — 3L THIN WALL CAN: Brand: CRD

## (undated) DEVICE — DEPAUL KNEE ARTHROSCOPY PACK: Brand: MEDLINE INDUSTRIES, INC.

## (undated) DEVICE — SUTURE STRATAFIX SYMMETRIC PDS + 1 SGL ARMED CT 18 IN LEN SXPP1A405

## (undated) DEVICE — HANDPIECE SET WITH HIGH FLOW TIP AND SUCTION TUBE: Brand: INTERPULSE

## (undated) DEVICE — SOLUTION IV 100ML 0.9% SOD CHL DIL INJ

## (undated) DEVICE — KENDALL SCD EXPRESS SLEEVES, KNEE LENGTH, MEDIUM: Brand: KENDALL SCD

## (undated) DEVICE — GOWN,AURORA,NON-REINFORCED,2XL: Brand: MEDLINE

## (undated) DEVICE — DRSG MEPILES BORDER AG 4X12 -- 5/BX

## (undated) DEVICE — THE CANADY HYBRID PLASMA SCALPEL IS AN ELECTROSURGICAL PLASMA SCALPEL THAT USES AN 85MM BENDABLE PADDLE BLADE TIP. THE ELECTROSURGICAL PLASMA SCALPEL IS USED TO SIMULTANEOUSLY CUT AND COAGULATE BIOLOGICAL TISSUE.: Brand: CANADY HYBRID PLASMA PADDLE BLADE

## (undated) DEVICE — KERLIX BANDAGE ROLL: Brand: KERLIX

## (undated) DEVICE — NEEDLE SPNL 20GA L3.5IN YEL HUB S STL REG WALL FIT STYL W/

## (undated) DEVICE — (D)PREP SKN CHLRAPRP APPL 26ML -- CONVERT TO ITEM 371833

## (undated) DEVICE — SUT VCRL + 1 36IN CT1 VIO --

## (undated) DEVICE — DISPOSABLE TOURNIQUET CUFF SINGLE BLADDER, SINGLE PORT AND QUICK CONNECT CONNECTOR: Brand: COLOR CUFF

## (undated) DEVICE — BANDAGE COMPR W4INXL5YD BGE COHESIVE SELF ADH ADBAN CBN1104] AVCOR HEALTHCARE PRODUCTS INC]

## (undated) DEVICE — SOLUTION SCRB 4OZ 10% PVP I POVIDONE IOD TOP PAINT EXIDINE

## (undated) DEVICE — SUTURE ETHLN SZ 2-0 L18IN NONABSORBABLE BLK L26MM PS 3/8 585H

## (undated) DEVICE — BLADE SAW 1.19X20X90 MM FOR LG BNE

## (undated) DEVICE — DRESSING,GAUZE,XEROFORM,CURAD,1"X8",ST: Brand: CURAD

## (undated) DEVICE — NDL SPNE QNCKE 18GX3.5IN LF --

## (undated) DEVICE — CONVERTORS STOCKINETTE: Brand: CONVERTORS